# Patient Record
Sex: FEMALE | Race: WHITE | Employment: OTHER | URBAN - METROPOLITAN AREA
[De-identification: names, ages, dates, MRNs, and addresses within clinical notes are randomized per-mention and may not be internally consistent; named-entity substitution may affect disease eponyms.]

---

## 2017-03-13 DIAGNOSIS — I10 ESSENTIAL HYPERTENSION: ICD-10-CM

## 2017-03-14 RX ORDER — ATENOLOL 25 MG/1
TABLET ORAL
Qty: 90 TAB | Refills: 0 | Status: SHIPPED | OUTPATIENT
Start: 2017-03-14 | End: 2017-05-08 | Stop reason: SDUPTHER

## 2017-03-14 NOTE — TELEPHONE ENCOUNTER
She is due in for her annual Medicare Wellness visit with Dr Yazmin Cordova and fasting labs by the end of next month.

## 2017-03-27 DIAGNOSIS — I10 ESSENTIAL HYPERTENSION: ICD-10-CM

## 2017-03-27 DIAGNOSIS — R73.03 PREDIABETES: ICD-10-CM

## 2017-03-27 DIAGNOSIS — E55.9 VITAMIN D DEFICIENCY: ICD-10-CM

## 2017-03-27 DIAGNOSIS — D36.9 TUBULAR ADENOMA: Primary | ICD-10-CM

## 2017-03-27 DIAGNOSIS — E78.00 PURE HYPERCHOLESTEROLEMIA: ICD-10-CM

## 2017-03-27 NOTE — LETTER
5/4/2017 12:26 PM 
 
Ms. Wero Mathis 
[address] [facility] [address] Dear Wero Mathis: 
 
Please find your most recent results below. Resulted Orders VITAMIN D, 25 HYDROXY Result Value Ref Range VITAMIN D, 25-HYDROXY 33.3 30.0 - 100.0 ng/mL Comment:  
   Vitamin D deficiency has been defined by the [address] [place] [address] practice guideline as a 
level of serum 25-OH vitamin D less than 20 ng/mL (1,2). The Endocrine Society went on to further define vitamin D 
insufficiency as a level between 21 and 29 ng/mL (2). 1. IOM ([place] of Medicine). 2010. Dietary reference 
   intakes for calcium and D. [facility]: The 
   [employer]. 2. Hyacinth MF, Linda OQUENDO, Jacek MARADIAGA, et al. 
   Evaluation, treatment, and prevention of vitamin D 
   deficiency: an Endocrine Society clinical practice 
   guideline. JCEM. 2011 Jul; 96(7):1911-30. Narrative Performed at:  [address]  [id number] : Rhett Stephens MD, Phone:  [phone] HEMOGLOBIN A1C WITH EAG Result Value Ref Range Hemoglobin A1c 6.2 (H) 4.8 - 5.6 % Comment:  
            Pre-diabetes: 5.7 - 6.4 Diabetes: >6.4 Glycemic control for adults with diabetes: <7.0 Estimated average glucose 131 mg/dL Narrative Performed at:  [address]  [id number] : Rhett Stephens MD, Phone:  [phone] CBC WITH AUTOMATED DIFF Result Value Ref Range WBC 5.8 3.4 - 10.8 x10E3/uL  
 RBC 4.58 3.77 - 5.28 x10E6/uL HGB 13.7 11.1 - 15.9 g/dL HCT 41.2 34.0 - 46.6 % MCV 90 79 - 97 fL  
 MCH 29.9 26.6 - 33.0 pg  
 MCHC 33.3 31.5 - 35.7 g/dL  
 RDW 15.0 12.3 - 15.4 % PLATELET 141 946 - 615 x10E3/uL NEUTROPHILS 54 % Lymphocytes 33 % MONOCYTES 8 % EOSINOPHILS 4 % BASOPHILS 1 % ABS. NEUTROPHILS 3.2 1.4 - 7.0 x10E3/uL Abs Lymphocytes 1.9 0.7 - 3.1 x10E3/uL  
 ABS. MONOCYTES 0.5 0.1 - 0.9 x10E3/uL  
 ABS. EOSINOPHILS 0.2 0.0 - 0.4 x10E3/uL  
 ABS. BASOPHILS 0.0 0.0 - 0.2 x10E3/uL IMMATURE GRANULOCYTES 0 %  
 ABS. IMM. GRANS. 0.0 0.0 - 0.1 x10E3/uL Narrative Performed at:  [address]  [id number] : Toña Cheung MD, Phone:  [phone] URINALYSIS W/ RFLX MICROSCOPIC Result Value Ref Range Specific Gravity 1.018 1.005 - 1.030  
 pH (UA) 6.5 5.0 - 7.5 Color Yellow Yellow Appearance Clear Clear Leukocyte Esterase Negative Negative Protein Negative Negative/Trace Glucose Negative Negative Ketone Negative Negative Blood Negative Negative Bilirubin Negative Negative Urobilinogen 0.2 0.2 - 1.0 mg/dL Nitrites Negative Negative Microscopic Examination Comment Comment:  
   Microscopic not indicated and not performed. Narrative Performed at:  [address]  [id number] : Toña Cheung MD, Phone:  [phone] TSH 3RD GENERATION Result Value Ref Range TSH 2.820 0.450 - 4.500 uIU/mL Narrative Performed at:  [address]  [id number] : Toña Cheung MD, Phone:  [phone] METABOLIC PANEL, COMPREHENSIVE Result Value Ref Range Glucose 104 (H) 65 - 99 mg/dL BUN 13 8 - 27 mg/dL Creatinine 0.65 0.57 - 1.00 mg/dL GFR est non-AA 88 >59 mL/min/1.73 GFR est  >59 mL/min/1.73  
 BUN/Creatinine ratio 20 12 - 28 Sodium 142 134 - 144 mmol/L Potassium 3.9 3.5 - 5.2 mmol/L Chloride 99 96 - 106 mmol/L  
 CO2 27 18 - 29 mmol/L Calcium 9.6 8.7 - 10.3 mg/dL Protein, total 6.1 6.0 - 8.5 g/dL Albumin 4.1 3.5 - 4.8 g/dL GLOBULIN, TOTAL 2.0 1.5 - 4.5 g/dL A-G Ratio 2.1 1.2 - 2.2 Bilirubin, total 0.5 0.0 - 1.2 mg/dL Alk. phosphatase 51 39 - 117 IU/L  
 AST (SGOT) 27 0 - 40 IU/L  
 ALT (SGPT) 20 0 - 32 IU/L Narrative Performed at:  [address]  [id number] : Lisbet Mauricio MD, Phone:  [phone] LIPID PANEL Result Value Ref Range Cholesterol, total 161 100 - 199 mg/dL Triglyceride 103 0 - 149 mg/dL HDL Cholesterol 68 >39 mg/dL VLDL, calculated 21 5 - 40 mg/dL LDL, calculated 72 0 - 99 mg/dL Narrative Performed at:  [address]  [id number] : Lisbet Mauricio MD, Phone:  [phone] CVD REPORT Result Value Ref Range INTERPRETATION Note Comment:  
   Supplement report is available. Narrative Performed at:  [address] [address] [place], [place]  [id number] : Altaf Mark PhD, Phone:  [phone] Josh Ibrahim MD

## 2017-04-05 ENCOUNTER — TELEPHONE (OUTPATIENT)
Dept: FAMILY MEDICINE CLINIC | Age: 72
End: 2017-04-05

## 2017-04-05 NOTE — TELEPHONE ENCOUNTER
Patient requesting a return call. Says she has questions about the paperwork she received in the mail. Her contact # is [phone].

## 2017-04-06 NOTE — TELEPHONE ENCOUNTER
Left message on name ID'd voicemail. She is most likely coming in the office this am with her .  Requested return call if we didn't speak this am.  Info discussed with patient when she did come in-she had question about insurance and this was answered by the [facility]

## 2017-04-27 LAB — COLONOSCOPY, EXTERNAL: NORMAL

## 2017-05-04 LAB
25(OH)D3+25(OH)D2 SERPL-MCNC: 33.3 NG/ML (ref 30–100)
ALBUMIN SERPL-MCNC: 4.1 G/DL (ref 3.5–4.8)
ALBUMIN/GLOB SERPL: 2.1 {RATIO} (ref 1.2–2.2)
ALP SERPL-CCNC: 51 IU/L (ref 39–117)
ALT SERPL-CCNC: 20 IU/L (ref 0–32)
APPEARANCE UR: CLEAR
AST SERPL-CCNC: 27 IU/L (ref 0–40)
BASOPHILS # BLD AUTO: 0 X10E3/UL (ref 0–0.2)
BASOPHILS NFR BLD AUTO: 1 %
BILIRUB SERPL-MCNC: 0.5 MG/DL (ref 0–1.2)
BILIRUB UR QL STRIP: NEGATIVE
BUN SERPL-MCNC: 13 MG/DL (ref 8–27)
BUN/CREAT SERPL: 20 (ref 12–28)
CALCIUM SERPL-MCNC: 9.6 MG/DL (ref 8.7–10.3)
CHLORIDE SERPL-SCNC: 99 MMOL/L (ref 96–106)
CHOLEST SERPL-MCNC: 161 MG/DL (ref 100–199)
CO2 SERPL-SCNC: 27 MMOL/L (ref 18–29)
COLOR UR: YELLOW
CREAT SERPL-MCNC: 0.65 MG/DL (ref 0.57–1)
EOSINOPHIL # BLD AUTO: 0.2 X10E3/UL (ref 0–0.4)
EOSINOPHIL NFR BLD AUTO: 4 %
ERYTHROCYTE [DISTWIDTH] IN BLOOD BY AUTOMATED COUNT: 15 % (ref 12.3–15.4)
EST. AVERAGE GLUCOSE BLD GHB EST-MCNC: 131 MG/DL
GLOBULIN SER CALC-MCNC: 2 G/DL (ref 1.5–4.5)
GLUCOSE SERPL-MCNC: 104 MG/DL (ref 65–99)
GLUCOSE UR QL: NEGATIVE
HBA1C MFR BLD: 6.2 % (ref 4.8–5.6)
HCT VFR BLD AUTO: 41.2 % (ref 34–46.6)
HDLC SERPL-MCNC: 68 MG/DL
HGB BLD-MCNC: 13.7 G/DL (ref 11.1–15.9)
HGB UR QL STRIP: NEGATIVE
IMM GRANULOCYTES # BLD: 0 X10E3/UL (ref 0–0.1)
IMM GRANULOCYTES NFR BLD: 0 %
INTERPRETATION, 910389: NORMAL
KETONES UR QL STRIP: NEGATIVE
LDLC SERPL CALC-MCNC: 72 MG/DL (ref 0–99)
LEUKOCYTE ESTERASE UR QL STRIP: NEGATIVE
LYMPHOCYTES # BLD AUTO: 1.9 X10E3/UL (ref 0.7–3.1)
LYMPHOCYTES NFR BLD AUTO: 33 %
MCH RBC QN AUTO: 29.9 PG (ref 26.6–33)
MCHC RBC AUTO-ENTMCNC: 33.3 G/DL (ref 31.5–35.7)
MCV RBC AUTO: 90 FL (ref 79–97)
MICRO URNS: NORMAL
MONOCYTES # BLD AUTO: 0.5 X10E3/UL (ref 0.1–0.9)
MONOCYTES NFR BLD AUTO: 8 %
NEUTROPHILS # BLD AUTO: 3.2 X10E3/UL (ref 1.4–7)
NEUTROPHILS NFR BLD AUTO: 54 %
NITRITE UR QL STRIP: NEGATIVE
PH UR STRIP: 6.5 [PH] (ref 5–7.5)
PLATELET # BLD AUTO: 230 X10E3/UL (ref 150–379)
POTASSIUM SERPL-SCNC: 3.9 MMOL/L (ref 3.5–5.2)
PROT SERPL-MCNC: 6.1 G/DL (ref 6–8.5)
PROT UR QL STRIP: NEGATIVE
RBC # BLD AUTO: 4.58 X10E6/UL (ref 3.77–5.28)
SODIUM SERPL-SCNC: 142 MMOL/L (ref 134–144)
SP GR UR: 1.02 (ref 1–1.03)
TRIGL SERPL-MCNC: 103 MG/DL (ref 0–149)
TSH SERPL DL<=0.005 MIU/L-ACNC: 2.82 UIU/ML (ref 0.45–4.5)
UROBILINOGEN UR STRIP-MCNC: 0.2 MG/DL (ref 0.2–1)
VLDLC SERPL CALC-MCNC: 21 MG/DL (ref 5–40)
WBC # BLD AUTO: 5.8 X10E3/UL (ref 3.4–10.8)

## 2017-05-08 ENCOUNTER — OFFICE VISIT (OUTPATIENT)
Dept: FAMILY MEDICINE CLINIC | Age: 72
End: 2017-05-08

## 2017-05-08 VITALS
WEIGHT: 206.8 LBS | TEMPERATURE: 97.4 F | RESPIRATION RATE: 18 BRPM | HEART RATE: 79 BPM | OXYGEN SATURATION: 95 % | DIASTOLIC BLOOD PRESSURE: 75 MMHG | HEIGHT: 64 IN | SYSTOLIC BLOOD PRESSURE: 144 MMHG | BODY MASS INDEX: 35.3 KG/M2

## 2017-05-08 DIAGNOSIS — M85.80 OSTEOPENIA, UNSPECIFIED LOCATION: ICD-10-CM

## 2017-05-08 DIAGNOSIS — Z00.00 MEDICARE ANNUAL WELLNESS VISIT, SUBSEQUENT: Primary | ICD-10-CM

## 2017-05-08 DIAGNOSIS — Z13.39 SCREENING FOR ALCOHOLISM: ICD-10-CM

## 2017-05-08 DIAGNOSIS — I10 ESSENTIAL HYPERTENSION: ICD-10-CM

## 2017-05-08 DIAGNOSIS — E78.00 PURE HYPERCHOLESTEROLEMIA: ICD-10-CM

## 2017-05-08 DIAGNOSIS — R73.03 PREDIABETES: ICD-10-CM

## 2017-05-08 RX ORDER — ATORVASTATIN CALCIUM 40 MG/1
40 TABLET, FILM COATED ORAL DAILY
Qty: 90 TAB | Refills: 3 | Status: SHIPPED | COMMUNITY
Start: 2017-05-08 | End: 2018-05-04 | Stop reason: SDUPTHER

## 2017-05-08 RX ORDER — RALOXIFENE HYDROCHLORIDE 60 MG/1
TABLET, FILM COATED ORAL
Qty: 90 TAB | Refills: 3 | Status: SHIPPED | COMMUNITY
Start: 2017-05-08 | End: 2018-05-04 | Stop reason: SDUPTHER

## 2017-05-08 RX ORDER — INDAPAMIDE 1.25 MG/1
1.25 TABLET, FILM COATED ORAL DAILY
Qty: 90 TAB | Refills: 3 | Status: SHIPPED | COMMUNITY
Start: 2017-05-08 | End: 2018-05-04 | Stop reason: SDUPTHER

## 2017-05-08 RX ORDER — ATENOLOL 25 MG/1
TABLET ORAL
Qty: 90 TAB | Refills: 3 | Status: SHIPPED | COMMUNITY
Start: 2017-05-08 | End: 2018-05-04 | Stop reason: SDUPTHER

## 2017-05-08 NOTE — PROGRESS NOTES
Nurse history read and confirmed by patient. Visit Vitals    /75 (BP 1 Location: Left arm, BP Patient Position: Sitting)    Pulse 79    Temp 97.4 °F (36.3 °C)    Resp 18    Ht 5' 4\" (1.626 m)    Wt 206 lb 12.8 oz (93.8 kg)    SpO2 95%    BMI 35.5 kg/m2       Patient alert and cooperative. Ears - canals, TMs clear. Jaw - no click appreciated. Assessment:  1. Left sided ear pain, question etiology. Does not appear to be due to the canal, TM or right TMJ.  2. HTN, controlled on low dose beta blocker and Indapamide. 3. Hypercholesterol, on statin. 4. Prediabetes, working on diet and exercise. 5. Osteopenia, on chronic Evista. Plan:  1. Refilled meds for a year. 2. Follow otherwise here prn.

## 2017-05-08 NOTE — ACP (ADVANCE CARE PLANNING)
Advance Care Planning    Advance Care Planning (ACP) Provider Conversation Snapshot    Date of ACP Conversation: 05/08/17  Persons included in Conversation:  patient  Length of ACP Conversation in minutes:  <16 minutes (Non-Billable)    Authorized Decision Maker (if patient is incapable of making informed decisions): This person is:    Other Legally Authorized Decision Maker (e.g. Next of Kin)  , daughter-Juliane, nephew-Germán        For Patients with Decision Making Capacity:   Values/Goals: Exploration of values, goals, and preferences if recovery is not expected, even with continued medical treatment in the event of:  Imminent death  Severe, permanent brain injury    Conversation Outcomes / Follow-Up Plan:   Reviewed existing Advance Directive

## 2017-05-08 NOTE — PROGRESS NOTES
Here for her wellness visit  She has seen derm since last visit-goes every 6 months- Dr Casas and we will request the report  Just had her colonoscopy done and we have this report  Eye exam is current and we will get this report from Dr Amiel Opitz  No ED visits since last visit here  ACP is on file  Non smoker and no plans to start

## 2017-05-08 NOTE — PROGRESS NOTES
HISTORY OF PRESENT ILLNESS  Samantha Claudio is a 74 y.o. female. HPI   Here for [address]     Review of Systems   Constitutional: Negative. HENT: Positive for ear pain. Eyes: Negative. Respiratory: Negative. Cardiovascular: Positive for palpitations. Gastrointestinal: Negative. Genitourinary: Negative. Musculoskeletal: Negative. Skin: Negative. Neurological: Negative. Endo/Heme/Allergies: Positive for environmental allergies. Psychiatric/Behavioral: Negative. Physical Exam   NA    ASSESSMENT and PLAN  See below     This is a Subsequent Medicare Annual Wellness Visit providing Personalized Prevention Plan Services (PPPS) (Performed 12 months after initial AWV and PPPS )    I have reviewed the patient's medical history in detail and updated the computerized patient record. Eye doctor past yr. Dentist 3 x annually. Colonoscopy last month. 10 yr repeat. No Gyn. Mammo annually. DEXA q 2 yrs. On Evista for osteopenia. Derm 2 x annually. H/o BCCAs. No signif hx past yr. History     Past Medical History:   Diagnosis Date    Abnormal CT scan 3/2015 Dr Senthil Sterling    ?  infectious process on CT 6/1/16 f/u note    Advance directive in chart 01/24/2012    Breast calcifications on mammogram     Cancer ([facility] [address] 1/2009    basal  cell dr Radha Collins q 6months 10/20/15    Dysphagia     Hiatal hernia     History of chicken pox     Hypercholesterolemia     Hypertension     Osteopenia     7/14/15 dexa still shows osteopenia    Seasonal allergies     Shingles     age 50's    Shoulder pain, left     Tubular adenoma of colon 2/3/12    dr Wes Deleon 5 yr repeat    Viral illness 12/23/14    Pt First note-flu like sx    Vitamin D deficiency       Past Surgical History:   Procedure Laterality Date    BREAST SURGERY PROCEDURE UNLISTED      history of several biopsies, college, and last one in 1's    COLONOSCOPY  2/3/12    5 yr repeat    ENDOSCOPY, COLON, DIAGNOSTIC  12/01    10 yr repeat. [place]    HX COLONOSCOPY  04/27/2017    10 yr repeat [facility]    c section    HX ORTHOPAEDIC  1996    rotator cuff/rt shoulder reconstruction surgery    HX SKIN BIOPSY  1/2009    dr Gurmeet Garza- basal cell     Current Outpatient Prescriptions   Medication Sig Dispense Refill    atenolol (TENORMIN) 25 mg tablet TAKE 1 TABLET DAILY 90 Tab 0    econazole nitrate (SPECTAZOLE) 1 % topical cream Apply  to affected area two (2) times a day. 15 g 0    Biotin 2,500 mcg cap Take 5,000 mcg by mouth daily.  fexofenadine (ALLEGRA) 180 mg tablet Take 180 mg by mouth. Take half tablet as needed      atorvastatin (LIPITOR) 40 mg tablet Take 1 Tab by mouth daily. 90 Tab 3    indapamide (LOZOL) 1.25 mg tablet Take 1 Tab by mouth daily. 90 Tab 3    raloxifene (EVISTA) 60 mg tablet TAKE 1 TABLET DAILY 90 Tab 3    Cholecalciferol, Vitamin D3, (VITAMIN D) 2,000 unit Cap Take 2,000 Units by mouth two (2) times a day.  aspirin delayed-release 81 mg tablet Take 81 mg by mouth daily. m-w-f       MULTIVITAMINS (MULTIPLE VITAMIN PO) Take  by mouth daily.        Allergies   Allergen Reactions    Sulfa (Sulfonamide Antibiotics) Rash and Itching    Actonel [Risedronate] Other (comments)     Gi upset    Erythromycin Other (comments)     GI upset    Pcn [Penicillins] Other (comments)     Pain in her hands     Family History   Problem Relation Age of Onset    Heart Disease Mother     Heart Disease Father     Cancer Father      bladder    Cancer Brother      espohagus    Stroke Maternal Grandfather      Social History   Substance Use Topics    Smoking status: Never Smoker    Smokeless tobacco: Never Used    Alcohol use 3.5 oz/week     7 Glasses of wine per week     Patient Active Problem List   Diagnosis Code    Essential hypertension I10    Pure hypercholesterolemia E78.00    Osteopenia M85.80    Allergic rhinitis J30.9    Vitamin D deficiency E55.9    Toenail fungus B35.1    Elevated BP [id number]    Incidental lung nodule, greater than or equal to 8mm R91.1    Prediabetes R73.03       Depression Risk Factor Screening:     PHQ over the last two weeks 5/8/2017   Little interest or pleasure in doing things Not at all   Feeling down, depressed or hopeless Not at all   Total Score PHQ 2 0     Alcohol Risk Factor Screening: On any occasion during the past 3 months, have you had more than 3 drinks containing alcohol? No    Do you average more than 7 drinks per week? No      Functional Ability and Level of Safety:     Hearing Loss   borderline normal    Activities of Daily Living   Self-care. Requires assistance with: no ADLs    Fall Risk     Fall Risk Assessment, last 12 mths 5/8/2017   Able to walk? Yes   Fall in past 12 months? No     Abuse Screen   Patient is not abused    Review of Systems   See above    Physical Examination     Evaluation of Cognitive Function:  Mood/affect:  happy  Appearance: age appropriate, casually dressed and within normal Limits  Family member/caregiver input: NA    No exam performed today, NA. Patient Care Team:  Leticia Mar MD as PCP - Sara Francois MD (Ophthalmology)  Susan Cleaning MD (Gastroenterology)  Elizabeth Schroeder MD (Dermatology)  Anahi iHll MD (Pulmonary Disease)  Sonia Washburn MD (Colon and Rectal Surgery)    Advice/Referrals/Counseling   Education and counseling provided:  Are appropriate based on today's review and evaluation  End-of-Life planning (with patient's consent)  Pneumococcal Vaccine  Influenza Vaccine  Screening Mammography  Colorectal cancer screening tests  Cardiovascular screening blood test  Bone mass measurement (DEXA)  Screening for glaucoma  Diabetes screening test      Assessment/Plan       ICD-10-CM ICD-9-CM    1. Medicare annual wellness visit, subsequent Z00.00 V70.0    2. Screening for alcoholism Z13.89 V79.1    3.  Osteopenia, unspecified location M85.80 733.90 raloxifene (EVISTA) 60 mg tablet 4. Prediabetes R73.03 790.29    5. Essential hypertension I10 401.9 atenolol (TENORMIN) 25 mg tablet      indapamide (LOZOL) 1.25 mg tablet   6. Pure hypercholesterolemia E78.00 272.0 atorvastatin (LIPITOR) 40 mg tablet     Follow-up Disposition:  Return in about 1 year (around 5/8/2018) for [address] [facility]

## 2017-05-31 ENCOUNTER — HOSPITAL ENCOUNTER (OUTPATIENT)
Dept: GENERAL RADIOLOGY | Age: 72
Discharge: HOME | End: 2017-05-31
Payer: MEDICARE

## 2017-05-31 DIAGNOSIS — R93.89 ABNORMAL CHEST CT: ICD-10-CM

## 2017-05-31 PROCEDURE — 71020 XR CHEST PA LAT: CPT

## 2018-03-02 ENCOUNTER — TELEPHONE (OUTPATIENT)
Dept: FAMILY MEDICINE CLINIC | Age: 73
End: 2018-03-02

## 2018-03-02 NOTE — TELEPHONE ENCOUNTER
----- Message from Rebecca Inman sent at 3/2/2018  1:06 PM EST -----  Regarding: Dr. Armando Wu / Telephone  Pt is requesting lab orders be mailed out to the her.   Best contact: [phone]

## 2018-03-07 DIAGNOSIS — I10 ESSENTIAL HYPERTENSION: ICD-10-CM

## 2018-03-07 DIAGNOSIS — M85.80 OSTEOPENIA, UNSPECIFIED LOCATION: ICD-10-CM

## 2018-03-07 DIAGNOSIS — R73.09 INCREASED GLUCOSE LEVEL: Primary | ICD-10-CM

## 2018-03-07 DIAGNOSIS — E78.00 PURE HYPERCHOLESTEROLEMIA: ICD-10-CM

## 2018-03-07 DIAGNOSIS — R73.03 PREDIABETES: ICD-10-CM

## 2018-03-07 DIAGNOSIS — E55.9 VITAMIN D DEFICIENCY: ICD-10-CM

## 2018-03-07 NOTE — LETTER
4/24/2018 8:49 AM 
 
Ms. Daniel Franklin [address] [place] [address] Dear Swetha Leonard: 
 
Please find your most recent results below. Resulted Orders HEMOGLOBIN A1C WITH EAG Result Value Ref Range Hemoglobin A1c 6.0 (H) 4.8 - 5.6 % Comment:  
            Pre-diabetes: 5.7 - 6.4 Diabetes: >6.4 Glycemic control for adults with diabetes: <7.0 Estimated average glucose 126 mg/dL Narrative Performed at:  [address]  [id number] : Randy Tena MD, Phone:  [phone] VITAMIN D, 25 HYDROXY Result Value Ref Range VITAMIN D, 25-HYDROXY 37.9 30.0 - 100.0 ng/mL Comment:  
   Vitamin D deficiency has been defined by the [address] [place] [address] practice guideline as a 
level of serum 25-OH vitamin D less than 20 ng/mL (1,2). The Endocrine Society went on to further define vitamin D 
insufficiency as a level between 21 and 29 ng/mL (2). 1. IOM ([place] of Medicine). 2010. Dietary reference 
   intakes for calcium and D. [address] [place] [address]: The 
   [employer]. 2. Hyacinth MF, Linda NC, Jacek MARADIAGA, et al. 
   Evaluation, treatment, and prevention of vitamin D 
   deficiency: an Endocrine Society clinical practice 
   guideline. JCEM. 2011 Jul; 96(7):1911-30. Narrative Performed at:  [address]  [id number] : Randy Tena MD, Phone:  [phone] CBC WITH AUTOMATED DIFF Result Value Ref Range WBC 6.4 3.4 - 10.8 x10E3/uL  
 RBC 4.74 3.77 - 5.28 x10E6/uL HGB 14.1 11.1 - 15.9 g/dL HCT 43.4 34.0 - 46.6 % MCV 92 79 - 97 fL  
 MCH 29.7 26.6 - 33.0 pg  
 MCHC 32.5 31.5 - 35.7 g/dL  
 RDW 15.0 12.3 - 15.4 % PLATELET 934 569 - 049 x10E3/uL NEUTROPHILS 52 Not Estab. % Lymphocytes 34 Not Estab. %  MONOCYTES 9 Not Estab. %  
 EOSINOPHILS 4 Not Estab. % BASOPHILS 1 Not Estab. %  
 ABS. NEUTROPHILS 3.3 1.4 - 7.0 x10E3/uL Abs Lymphocytes 2.2 0.7 - 3.1 x10E3/uL  
 ABS. MONOCYTES 0.6 0.1 - 0.9 x10E3/uL  
 ABS. EOSINOPHILS 0.2 0.0 - 0.4 x10E3/uL  
 ABS. BASOPHILS 0.0 0.0 - 0.2 x10E3/uL IMMATURE GRANULOCYTES 0 Not Estab. %  
 ABS. IMM. GRANS. 0.0 0.0 - 0.1 x10E3/uL Narrative Performed at:  [address]  [id number] : Savanah Ricci MD, Phone:  [phone] URINALYSIS W/ RFLX MICROSCOPIC Result Value Ref Range Specific Gravity 1.009 1.005 - 1.030  
 pH (UA) 7.5 5.0 - 7.5 Color Yellow Yellow Appearance Clear Clear Leukocyte Esterase Negative Negative Protein Negative Negative/Trace Glucose Negative Negative Ketone Negative Negative Blood Negative Negative Bilirubin Negative Negative Urobilinogen 0.2 0.2 - 1.0 mg/dL Nitrites Negative Negative Microscopic Examination Comment Comment:  
   Microscopic not indicated and not performed. Narrative Performed at:  [address]  [id number] : Savanah Ricci MD, Phone:  [phone] TSH 3RD GENERATION Result Value Ref Range TSH 3.230 0.450 - 4.500 uIU/mL Narrative Performed at:  [address]  [id number] : Savanah Ricci MD, Phone:  [phone] METABOLIC PANEL, COMPREHENSIVE Result Value Ref Range Glucose 104 (H) 65 - 99 mg/dL Comment:  
   Specimen received hemolyzed. Clinical correlation indicated. BUN 15 8 - 27 mg/dL Creatinine 0.67 0.57 - 1.00 mg/dL GFR est non-AA 86 >59 mL/min/1.73 GFR est AA 99 >59 mL/min/1.73  
 BUN/Creatinine ratio 22 12 - 28 Sodium 142 134 - 144 mmol/L Potassium 4.3 3.5 - 5.2 mmol/L Comment:  
   Specimen received hemolyzed. Clinical correlation indicated.   
 Chloride 99 96 - 106 mmol/L  
 CO2 25 18 - 29 mmol/L Calcium 9.6 8.7 - 10.3 mg/dL Protein, total 6.5 6.0 - 8.5 g/dL Albumin 4.1 3.5 - 4.8 g/dL GLOBULIN, TOTAL 2.4 1.5 - 4.5 g/dL A-G Ratio 1.7 1.2 - 2.2 Bilirubin, total 0.5 0.0 - 1.2 mg/dL Alk. phosphatase 53 39 - 117 IU/L  
 AST (SGOT) 31 0 - 40 IU/L  
 ALT (SGPT) 24 0 - 32 IU/L Narrative Performed at:  [address]  [id number] : Lacy Cervantes MD, Phone:  [phone] LIPID PANEL Result Value Ref Range Cholesterol, total 173 100 - 199 mg/dL Triglyceride 113 0 - 149 mg/dL HDL Cholesterol 70 >39 mg/dL VLDL, calculated 23 5 - 40 mg/dL LDL, calculated 80 0 - 99 mg/dL Narrative Performed at:  [address]  [id number] : Lacy Cervantes MD, Phone:  [phone] CVD REPORT Result Value Ref Range INTERPRETATION Note Comment:  
   Supplemental report is available. Narrative Performed at:  [address] [place] [address]  [id number] : Jane Locke PhD, Phone:  [phone] Average BS remains in mid prediabetic range, sltly improved.  Rest labs all OK.  
   
Attached to Please call me if you have any questions: [phone] Sincerely, Kiel Kaur MD

## 2018-04-19 LAB
25(OH)D3+25(OH)D2 SERPL-MCNC: 37.9 NG/ML (ref 30–100)
ALBUMIN SERPL-MCNC: 4.1 G/DL (ref 3.5–4.8)
ALBUMIN/GLOB SERPL: 1.7 {RATIO} (ref 1.2–2.2)
ALP SERPL-CCNC: 53 IU/L (ref 39–117)
ALT SERPL-CCNC: 24 IU/L (ref 0–32)
APPEARANCE UR: CLEAR
AST SERPL-CCNC: 31 IU/L (ref 0–40)
BASOPHILS # BLD AUTO: 0 X10E3/UL (ref 0–0.2)
BASOPHILS NFR BLD AUTO: 1 %
BILIRUB SERPL-MCNC: 0.5 MG/DL (ref 0–1.2)
BILIRUB UR QL STRIP: NEGATIVE
BUN SERPL-MCNC: 15 MG/DL (ref 8–27)
BUN/CREAT SERPL: 22 (ref 12–28)
CALCIUM SERPL-MCNC: 9.6 MG/DL (ref 8.7–10.3)
CHLORIDE SERPL-SCNC: 99 MMOL/L (ref 96–106)
CHOLEST SERPL-MCNC: 173 MG/DL (ref 100–199)
CO2 SERPL-SCNC: 25 MMOL/L (ref 18–29)
COLOR UR: YELLOW
CREAT SERPL-MCNC: 0.67 MG/DL (ref 0.57–1)
EOSINOPHIL # BLD AUTO: 0.2 X10E3/UL (ref 0–0.4)
EOSINOPHIL NFR BLD AUTO: 4 %
ERYTHROCYTE [DISTWIDTH] IN BLOOD BY AUTOMATED COUNT: 15 % (ref 12.3–15.4)
EST. AVERAGE GLUCOSE BLD GHB EST-MCNC: 126 MG/DL
GFR SERPLBLD CREATININE-BSD FMLA CKD-EPI: 86 ML/MIN/1.73
GFR SERPLBLD CREATININE-BSD FMLA CKD-EPI: 99 ML/MIN/1.73
GLOBULIN SER CALC-MCNC: 2.4 G/DL (ref 1.5–4.5)
GLUCOSE SERPL-MCNC: 104 MG/DL (ref 65–99)
GLUCOSE UR QL: NEGATIVE
HBA1C MFR BLD: 6 % (ref 4.8–5.6)
HCT VFR BLD AUTO: 43.4 % (ref 34–46.6)
HDLC SERPL-MCNC: 70 MG/DL
HGB BLD-MCNC: 14.1 G/DL (ref 11.1–15.9)
HGB UR QL STRIP: NEGATIVE
IMM GRANULOCYTES # BLD: 0 X10E3/UL (ref 0–0.1)
IMM GRANULOCYTES NFR BLD: 0 %
INTERPRETATION, 910389: NORMAL
KETONES UR QL STRIP: NEGATIVE
LDLC SERPL CALC-MCNC: 80 MG/DL (ref 0–99)
LEUKOCYTE ESTERASE UR QL STRIP: NEGATIVE
LYMPHOCYTES # BLD AUTO: 2.2 X10E3/UL (ref 0.7–3.1)
LYMPHOCYTES NFR BLD AUTO: 34 %
MCH RBC QN AUTO: 29.7 PG (ref 26.6–33)
MCHC RBC AUTO-ENTMCNC: 32.5 G/DL (ref 31.5–35.7)
MCV RBC AUTO: 92 FL (ref 79–97)
MICRO URNS: NORMAL
MONOCYTES # BLD AUTO: 0.6 X10E3/UL (ref 0.1–0.9)
MONOCYTES NFR BLD AUTO: 9 %
NEUTROPHILS # BLD AUTO: 3.3 X10E3/UL (ref 1.4–7)
NEUTROPHILS NFR BLD AUTO: 52 %
NITRITE UR QL STRIP: NEGATIVE
PH UR STRIP: 7.5 [PH] (ref 5–7.5)
PLATELET # BLD AUTO: 241 X10E3/UL (ref 150–379)
POTASSIUM SERPL-SCNC: 4.3 MMOL/L (ref 3.5–5.2)
PROT SERPL-MCNC: 6.5 G/DL (ref 6–8.5)
PROT UR QL STRIP: NEGATIVE
RBC # BLD AUTO: 4.74 X10E6/UL (ref 3.77–5.28)
SODIUM SERPL-SCNC: 142 MMOL/L (ref 134–144)
SP GR UR: 1.01 (ref 1–1.03)
TRIGL SERPL-MCNC: 113 MG/DL (ref 0–149)
TSH SERPL DL<=0.005 MIU/L-ACNC: 3.23 UIU/ML (ref 0.45–4.5)
UROBILINOGEN UR STRIP-MCNC: 0.2 MG/DL (ref 0.2–1)
VLDLC SERPL CALC-MCNC: 23 MG/DL (ref 5–40)
WBC # BLD AUTO: 6.4 X10E3/UL (ref 3.4–10.8)

## 2018-05-02 ENCOUNTER — PATIENT MESSAGE (OUTPATIENT)
Dept: FAMILY MEDICINE CLINIC | Age: 73
End: 2018-05-02

## 2018-05-02 DIAGNOSIS — M85.80 OSTEOPENIA, UNSPECIFIED LOCATION: ICD-10-CM

## 2018-05-02 DIAGNOSIS — I10 ESSENTIAL HYPERTENSION: ICD-10-CM

## 2018-05-02 DIAGNOSIS — E78.00 PURE HYPERCHOLESTEROLEMIA: ICD-10-CM

## 2018-05-04 NOTE — TELEPHONE ENCOUNTER
From: Cinthia Brown  To: Gillian Davies MD  Sent: 5/2/2018 12:13 PM EDT  Subject: Non-Urgent Medical Question    Hi Josueazeb Guthrie received a message saying Dr. Tanner Wiggins would not be back in the office until July. I have an appointment with him next week on May 9th. Who is covering for him? Also will you check on my 90 day mail order refills? Thanks for mailing my lab results.   Dwayne Mariee

## 2018-05-04 NOTE — TELEPHONE ENCOUNTER
PCP: Kevin Martínez MD    Last appt: 5/8/2017  No future appointments. Requested Prescriptions     Pending Prescriptions Disp Refills    atenolol (TENORMIN) 25 mg tablet 90 Tab 0     Sig: TAKE 1 TABLET DAILY    raloxifene (EVISTA) 60 mg tablet 90 Tab 0     Sig: TAKE 1 TABLET DAILY    indapamide (LOZOL) 1.25 mg tablet 90 Tab 0     Sig: Take 1 Tab by mouth daily.  atorvastatin (LIPITOR) 40 mg tablet 90 Tab 0     Sig: Take 1 Tab by mouth daily.      Lab Results   Component Value Date/Time    Sodium 142 04/18/2018 08:35 AM    Potassium 4.3 04/18/2018 08:35 AM    Chloride 99 04/18/2018 08:35 AM    CO2 25 04/18/2018 08:35 AM    Anion gap 5 07/20/2009 09:53 AM    Glucose 104 (H) 04/18/2018 08:35 AM    BUN 15 04/18/2018 08:35 AM    Creatinine 0.67 04/18/2018 08:35 AM    BUN/Creatinine ratio 22 04/18/2018 08:35 AM    GFR est AA 99 04/18/2018 08:35 AM    GFR est non-AA 86 04/18/2018 08:35 AM    Calcium 9.6 04/18/2018 08:35 AM     Lab Results   Component Value Date/Time    Hemoglobin A1c 6.0 (H) 04/18/2018 08:35 AM    Hemoglobin A1c (POC) 5.8 04/07/2016 11:02 AM     Lab Results   Component Value Date/Time    Cholesterol, total 173 04/18/2018 08:35 AM    HDL Cholesterol 70 04/18/2018 08:35 AM    LDL, calculated 80 04/18/2018 08:35 AM    VLDL, calculated 23 04/18/2018 08:35 AM    Triglyceride 113 04/18/2018 08:35 AM    CHOL/HDL Ratio 2.7 07/27/2010 09:38 AM     Lab Results   Component Value Date/Time    TSH 3.230 04/18/2018 08:35 AM

## 2018-05-08 RX ORDER — ATENOLOL 25 MG/1
TABLET ORAL
Qty: 90 TAB | Refills: 0 | Status: SHIPPED | OUTPATIENT
Start: 2018-05-08 | End: 2018-07-26 | Stop reason: SDUPTHER

## 2018-05-08 RX ORDER — ATORVASTATIN CALCIUM 40 MG/1
40 TABLET, FILM COATED ORAL DAILY
Qty: 90 TAB | Refills: 0 | Status: SHIPPED | OUTPATIENT
Start: 2018-05-08 | End: 2018-07-26 | Stop reason: SDUPTHER

## 2018-05-08 RX ORDER — INDAPAMIDE 1.25 MG/1
1.25 TABLET, FILM COATED ORAL DAILY
Qty: 90 TAB | Refills: 0 | Status: SHIPPED | OUTPATIENT
Start: 2018-05-08 | End: 2018-07-26 | Stop reason: SDUPTHER

## 2018-05-08 RX ORDER — RALOXIFENE HYDROCHLORIDE 60 MG/1
TABLET, FILM COATED ORAL
Qty: 90 TAB | Refills: 0 | Status: SHIPPED | OUTPATIENT
Start: 2018-05-08 | End: 2018-07-26 | Stop reason: SDUPTHER

## 2018-07-26 ENCOUNTER — OFFICE VISIT (OUTPATIENT)
Dept: FAMILY MEDICINE CLINIC | Age: 73
End: 2018-07-26

## 2018-07-26 VITALS
HEIGHT: 64 IN | BODY MASS INDEX: 34.98 KG/M2 | SYSTOLIC BLOOD PRESSURE: 155 MMHG | RESPIRATION RATE: 14 BRPM | HEART RATE: 66 BPM | DIASTOLIC BLOOD PRESSURE: 81 MMHG | WEIGHT: 204.9 LBS | OXYGEN SATURATION: 92 % | TEMPERATURE: 96.7 F

## 2018-07-26 DIAGNOSIS — Z00.00 MEDICARE ANNUAL WELLNESS VISIT, SUBSEQUENT: Primary | ICD-10-CM

## 2018-07-26 DIAGNOSIS — E66.01 SEVERE OBESITY (BMI 35.0-39.9): ICD-10-CM

## 2018-07-26 DIAGNOSIS — Z71.89 ADVANCED DIRECTIVES, COUNSELING/DISCUSSION: ICD-10-CM

## 2018-07-26 DIAGNOSIS — I10 ELEVATED BLOOD PRESSURE READING IN OFFICE WITH DIAGNOSIS OF HYPERTENSION: ICD-10-CM

## 2018-07-26 DIAGNOSIS — I10 ESSENTIAL HYPERTENSION: ICD-10-CM

## 2018-07-26 DIAGNOSIS — M85.80 OSTEOPENIA, UNSPECIFIED LOCATION: ICD-10-CM

## 2018-07-26 DIAGNOSIS — E78.00 PURE HYPERCHOLESTEROLEMIA: ICD-10-CM

## 2018-07-26 RX ORDER — ATENOLOL 25 MG/1
TABLET ORAL
Qty: 90 TAB | Refills: 2 | Status: SHIPPED | COMMUNITY
Start: 2018-07-26 | End: 2019-06-14 | Stop reason: SDUPTHER

## 2018-07-26 RX ORDER — RALOXIFENE HYDROCHLORIDE 60 MG/1
TABLET, FILM COATED ORAL
Qty: 90 TAB | Refills: 2 | Status: SHIPPED | COMMUNITY
Start: 2018-07-26 | End: 2019-06-21 | Stop reason: SDUPTHER

## 2018-07-26 RX ORDER — ATORVASTATIN CALCIUM 40 MG/1
40 TABLET, FILM COATED ORAL DAILY
Qty: 90 TAB | Refills: 2 | Status: SHIPPED | COMMUNITY
Start: 2018-07-26 | End: 2018-12-13 | Stop reason: SDUPTHER

## 2018-07-26 RX ORDER — INDAPAMIDE 1.25 MG/1
1.25 TABLET, FILM COATED ORAL DAILY
Qty: 90 TAB | Refills: 2 | Status: SHIPPED | COMMUNITY
Start: 2018-07-26 | End: 2019-06-14 | Stop reason: SDUPTHER

## 2018-07-26 NOTE — PROGRESS NOTES
Hi Mcgill is a 74 y.o. female      Chief Complaint   Patient presents with   [facility] Wellness Visit       1. Have you been to the ER, urgent care clinic since your last visit? Hospitalized since your last visit? No    2. Have you seen or consulted any other health care providers outside of the [address] [place] [address] since your last visit? Include any pap smears or colon screening.  No

## 2018-07-26 NOTE — PROGRESS NOTES
This is the Subsequent Medicare Annual Wellness Exam, performed 12 months or more after the Initial AWV or the last Subsequent AWV    I have reviewed the patient's medical history in detail and updated the computerized patient record. Eye doctor past yr. Dentist q 4 mos. Derm q 6 mos. Annual mammo. Colonoscopy '14.  10 yr repeat. DEXA q 2 yrs for osteopenia. FH osteoporosis. Still on Evista. H/o BCCAs. No signif hx past yr. History     Past Medical History:   Diagnosis Date    Abnormal CT scan 3/2015 eval by Dr Jigar Peterson    ? infectious process on CT 6/1/16 f/u note  Pulmo Asso note 6/2/17    Acute low back pain 08/15/2017    2 visits to [facility] with Xrays on 2nd visit 8/18/17    Advance directive in chart 01/24/2012    Breast calcifications on mammogram     Cancer ([facility] [address] 1/2009    basal  cell dr Anaya Vuong q 6months 10/20/15    Dysphagia     Hiatal hernia     History of chicken pox     Hypercholesterolemia     Hypertension     Osteopenia     7/14/15 dexa still shows osteopenia    Screening for glaucoma 12/15/2016    note from [facility] w/IOP    Seasonal allergies     Shingles     age 50's    Shoulder pain, left     Skin exam, screening for cancer 04/12/2017    note from derm rec'd [place]    Tubular adenoma of colon 2/3/12    dr Yulissa Iniguez 5 yr repeat    Viral illness 12/23/14    Pt First note-flu like sx    Vitamin D deficiency       Past Surgical History:   Procedure Laterality Date    BREAST SURGERY PROCEDURE UNLISTED      history of several biopsies, college, and last one in 1's    COLONOSCOPY  2/3/12    5 yr repeat    ENDOSCOPY, COLON, DIAGNOSTIC  12/01    10 yr repeat.   [facility]    HX COLONOSCOPY  04/27/2017    10 yr [facility]    [facility] section    HX ORTHOPAEDIC  1996    rotator cuff/rt shoulder reconstruction surgery    HX SKIN BIOPSY  1/2009    dr Anaya Vuong- basal cell     Current Outpatient Prescriptions   Medication Sig Dispense Refill    atenolol (TENORMIN) 25 mg tablet TAKE 1 TABLET DAILY 90 Tab 0    raloxifene (EVISTA) 60 mg tablet TAKE 1 TABLET DAILY 90 Tab 0    indapamide (LOZOL) 1.25 mg tablet Take 1 Tab by mouth daily. 90 Tab 0    atorvastatin (LIPITOR) 40 mg tablet Take 1 Tab by mouth daily. 90 Tab 0    econazole nitrate (SPECTAZOLE) 1 % topical cream Apply  to affected area two (2) times a day. (Patient taking differently: Apply  to affected area as needed.) 15 g 0    Biotin 2,500 mcg cap Take 5,000 mcg by mouth daily.  fexofenadine (ALLEGRA) 180 mg tablet Take 180 mg by mouth. Take half tablet as needed      Cholecalciferol, Vitamin D3, (VITAMIN D) 2,000 unit Cap Take 2,000 Units by mouth two (2) times a day.  aspirin delayed-release 81 mg tablet Take 81 mg by mouth daily. m-w-f       MULTIVITAMINS (MULTIPLE VITAMIN PO) Take  by mouth daily.        Allergies   Allergen Reactions    Sulfa (Sulfonamide Antibiotics) Rash and Itching    Actonel [Risedronate] Other (comments)     Gi upset    Erythromycin Other (comments)     GI upset    Pcn [Penicillins] Other (comments)     Pain in her hands     Family History   Problem Relation Age of Onset    Heart Disease Mother     Heart Disease Father     Cancer Father      bladder    Cancer Brother      espohagus    Stroke Maternal Grandfather      Social History   Substance Use Topics    Smoking status: Never Smoker    Smokeless tobacco: Never Used    Alcohol use 3.5 oz/week     7 Glasses of wine per week     Patient Active Problem List   Diagnosis Code    Essential hypertension I10    Pure hypercholesterolemia E78.00    Osteopenia M85.80    Allergic rhinitis J30.9    Vitamin D deficiency E55.9    Toenail fungus B35.1    Incidental lung nodule, greater than or equal to 8mm R91.1    Prediabetes R73.03    Increased glucose level R73.09    Severe obesity (BMI 35.0-39.9) ([facility]) E66.01       Depression Risk Factor Screening:     PHQ over the last two weeks 7/26/2018 Little interest or pleasure in doing things Not at all   Feeling down, depressed, irritable, or hopeless Not at all   Total Score PHQ 2 0     Alcohol Risk Factor Screening: You do not drink alcohol or very rarely. Functional Ability and Level of Safety:   Hearing Loss  Hearing is good. Activities of Daily Living  The home contains: handrails, grab bars and rugs  Patient does total self care    Fall Risk  Fall Risk Assessment, last 12 mths 7/26/2018   Able to walk? Yes   Fall in past 12 months? No       Abuse Screen  Patient is not abused    Cognitive Screening   Evaluation of Cognitive Function:  Has your family/caregiver stated any concerns about your memory: no  Normal    Patient Care Team   Patient Care Team:  Je Shafer MD as PCP - Balbir Powell MD (Ophthalmology)  Leopold Brooke, MD (Gastroenterology)  Sakina Hayward MD (Dermatology)  Bessy Singh MD (Pulmonary Disease)  Deloris Najjar, MD (Colon and Rectal Surgery)    Assessment/Plan   Education and counseling provided:  Are appropriate based on today's review and evaluation  End-of-Life planning (with patient's consent)  Pneumococcal Vaccine  Influenza Vaccine  Screening Mammography  Colorectal cancer screening tests  Cardiovascular screening blood test  Bone mass measurement (DEXA)  Screening for glaucoma  Diabetes screening test    Diagnoses and all orders for this visit:    1. Medicare annual wellness visit, subsequent    2. Advanced directives, counseling/discussion    3. Essential hypertension  -     atenolol (TENORMIN) 25 mg tablet; TAKE 1 TABLET DAILY  -     indapamide (LOZOL) 1.25 mg tablet; Take 1 Tab by mouth daily. 4. Osteopenia, unspecified location  -     raloxifene (EVISTA) 60 mg tablet; TAKE 1 TABLET DAILY    5. Pure hypercholesterolemia  -     atorvastatin (LIPITOR) 40 mg tablet; Take 1 Tab by mouth daily.     6. Severe obesity (BMI 35.0-39.9) ([facility])    7. Elevated blood pressure reading in office with diagnosis of hypertension        Health Maintenance Due   Topic Date Due    MEDICARE YEARLY EXAM  05/09/2018

## 2018-07-26 NOTE — MR AVS SNAPSHOT
[address] [place] [address] 
[address] [place] [address] 
[phone] Patient: Wai Barker MRN:  [id number] Visit Information Date & Time Provider Department Dept. Phone Encounter #  
 7/26/2018  1:20 PM Berenice Chang MD [facility] [phone] [id number] Upcoming Health Maintenance Date Due  
 MEDICARE YEARLY EXAM 5/9/2018 BREAST CANCER SCRN MAMMOGRAM 8/26/2018* Influenza Age 5 to Adult 8/1/2018 GLAUCOMA SCREENING Q2Y 12/15/2018 DTaP/Tdap/Td series (2 - Td) 6/5/2025 COLONOSCOPY 4/27/2027 *Topic was postponed. The date shown is not the original due date. Allergies as of 7/26/2018  Review Complete On: 7/26/2018 By: Berenice Chang MD  
  
 Severity Noted Reaction Type Reactions Sulfa (Sulfonamide Antibiotics) High 01/25/2010    Rash, Itching Actonel [Risedronate]  01/25/2010    Other (comments) Gi upset Erythromycin  01/25/2010    Other (comments) GI upset Pcn [Penicillins]  01/25/2010    Other (comments) Pain in her hands Current Immunizations  Reviewed on 10/30/2017 Name Date Influenza High Dose Vaccine PF 10/13/2017, 10/19/2016, 10/2/2015 Influenza Vaccine 10/13/2014, 10/11/2013 Influenza Vaccine Split 10/23/2012, 11/1/2011, 10/10/2010 PPD 1/17/2011 Pneumococcal Conjugate (PCV-13) 3/17/2015 Pneumococcal Polysaccharide (PPSV-23) 12/15/2011, 12/1/2006 TD Vaccine 1/19/2011, 5/3/2001 Tdap 6/5/2015 Not reviewed this visit You Were Diagnosed With   
  
 Codes Comments Medicare annual wellness visit, subsequent    -  Primary ICD-10-CM: Z00.00 ICD-9-CM: V70.0 Advanced directives, counseling/discussion     ICD-10-CM: Z71.89 ICD-9-CM: V65.49 Essential hypertension     ICD-10-CM: I10 
ICD-9-CM: 401.9 Osteopenia, unspecified location     ICD-10-CM: M85.80 ICD-9-CM: 733.90  Pure hypercholesterolemia     ICD-10-CM: E78.00 
 ICD-9-CM: 272.0 Severe obesity (BMI 35.0-39.9) ([facility])     ICD-10-CM: E66.01 
ICD-9-CM: 278.01 Elevated blood pressure reading in office with diagnosis of hypertension     ICD-10-CM: I10 
ICD-9-CM: 401.9 Vitals BP Pulse Temp Resp Height(growth percentile) Weight(growth percentile) 155/81 (BP 1 Location: Right arm, BP Patient Position: Sitting) 66 96.7 °F (35.9 °C) (Oral) 14 5' 4\" (1.626 m) 204 lb 14.4 oz (92.9 kg) SpO2 BMI OB Status Smoking Status 92% 35.17 kg/m2 Postmenopausal Never Smoker Vitals History BMI and BSA Data Body Mass Index Body Surface Area  
 35.17 kg/m 2 2.05 m 2 Preferred Pharmacy Pharmacy Name Phone  [facility] [phone] Your Updated Medication List  
  
   
This list is accurate as of 7/26/18  2:25 PM.  Always use your most recent med list.  
  
  
  
  
 aspirin delayed-release 81 mg tablet Take 81 mg by mouth daily. m-w-f  
  
 atenolol 25 mg tablet Commonly known as:  TENORMIN  
TAKE 1 TABLET DAILY  
  
 atorvastatin 40 mg tablet Commonly known as:  LIPITOR Take 1 Tab by mouth daily. Biotin 2,500 mcg Cap Take 5,000 mcg by mouth daily. econazole nitrate 1 % topical cream  
Commonly known as:  Richie Maguire Apply  to affected area two (2) times a day. fexofenadine 180 mg tablet Commonly known as:  Avonne Day Take 180 mg by mouth. Take half tablet as needed  
  
 indapamide 1.25 mg tablet Commonly known as:  Hershall Cease Take 1 Tab by mouth daily. MULTIPLE VITAMIN PO Take  by mouth daily. raloxifene 60 mg tablet Commonly known as:  EVISTA TAKE 1 TABLET DAILY  
  
 VITAMIN D 2,000 unit Cap capsule Generic drug:  Cholecalciferol (Vitamin D3) Take 2,000 Units by mouth two (2) times a day. Prescriptions Sent to Mail Order Refills  
 atenolol (TENORMIN) 25 mg tablet 2 Sig: TAKE 1 TABLET DAILY Class: Mail Order Pharmacy: [address] Ph #: [phone]  
 raloxifene (EVISTA) 60 mg tablet 2 Sig: TAKE 1 TABLET DAILY Class: Mail Order Pharmacy: [address] Ph #: [phone] indapamide (LOZOL) 1.25 mg tablet 2 Sig: Take 1 Tab by mouth daily. Class: Mail Order Pharmacy: [address] Ph #: [phone] Route: Oral  
 atorvastatin (LIPITOR) 40 mg tablet 2 Sig: Take 1 Tab by mouth daily. Class: Mail Order Pharmacy: [address] Ph #: [phone] Route: Oral  
  
Patient Instructions Medicare Wellness Visit, Female The best way to live healthy is to have a lifestyle where you eat a well-balanced diet, exercise regularly, limit alcohol use, and quit all forms of tobacco/nicotine, if applicable. Regular preventive services are another way to keep healthy. Preventive services (vaccines, screening tests, monitoring & exams) can help personalize your care plan, which helps you manage your own care. Screening tests can find health problems at the earliest stages, when they are easiest to treat. [address] [facility] follows the current, evidence-based guidelines published by the [place] Jose Lata (USPSTF) when recommending preventive services for our patients. Because we follow these guidelines, sometimes recommendations change over time as research supports it. (For example, mammograms used to be recommended annually. Even though Medicare will still pay for an annual mammogram, the newer guidelines recommend a mammogram every two years for women of average risk.) Of course, you and your provider may decide to screen more often for some diseases, based on your risk and co-morbidities (chronic disease you are already diagnosed with). Preventive services for you include: - Medicare offers their members a free annual wellness visit, which is time for you and your primary care provider to discuss and plan for your preventive service needs. Take advantage of this benefit every year! 
 
-All people over age 72 should receive the recommended pneumonia vaccines. Current USPSTF guidelines recommend a series of two vaccines for the best pneumonia protection.  
 
-All adults should have a yearly flu vaccine and a tetanus vaccine every 10 years. All adults age 61 years should receive a shingles vaccine once in their lifetime.   
 
-A bone mass density test is recommended when a woman turns 65 to screen for osteoporosis. This test is only recommended once as a screening. Some providers will use this same test as a disease monitoring tool if you already have osteoporosis. -All adults age 38-68 years who are overweight should have a diabetes screening test once every three years.  
 
-Other screening tests & preventive services for persons with diabetes include: an eye exam to screen for diabetic retinopathy, a kidney function test, a foot exam, and stricter control over your cholesterol.  
 
-Cardiovascular screening for adults with routine risk involves an electrocardiogram (ECG) at intervals determined by the provider.  
 
-Colorectal cancer screenings should be done for adults age 54-65 years with normal risk. There are a number of acceptable methods of screening for this type of cancer. Each test has its own benefits and drawbacks. Discuss with your provider what is most appropriate for you during your annual wellness visit. The different tests include: colonoscopy (considered the best screening method), a fecal occult blood test, a fecal DNA test, and sigmoidoscopy. -Breast cancer screenings are recommended every other year for women of normal risk age 54-69 years.  
 
-Cervical cancer screenings for women over age 72 are only recommended with certain risk factors. -All adults born between [facility] should be screened once for Hepatitis C. Here is a list of your current Health Maintenance items (your personalized list of preventive services) with a due date: 
Health Maintenance Due Topic Date Due  
 Annual Well Visit  05/09/2018 Introducing [facility]! Dear Lorena Roberto: Thank you for requesting a [employer] account. Our records indicate that you already have an active [employer] account. You can access your account anytime at [email or web address] Did you know that you can access your hospital and ER discharge instructions at any time in [employer]? You can also review all of your test results from your hospital stay or ER visit. Additional Information If you have questions, please visit the Frequently Asked Questions section of the [employer] website at [email or web address] Remember, [employer] is NOT to be used for urgent needs. For medical emergencies, dial 911. Now available from your iPhone and Android! Please provide this summary of care documentation to your next provider. Your primary care clinician is listed as [id number] If you have any questions after today's visit, please call [phone].

## 2018-07-26 NOTE — PATIENT INSTRUCTIONS
Medicare Wellness Visit, Female    The best way to live healthy is to have a lifestyle where you eat a well-balanced diet, exercise regularly, limit alcohol use, and quit all forms of tobacco/nicotine, if applicable. Regular preventive services are another way to keep healthy. Preventive services (vaccines, screening tests, monitoring & exams) can help personalize your care plan, which helps you manage your own care. Screening tests can find health problems at the earliest stages, when they are easiest to treat. [address] [facility] [facility] follows the current, evidence-based guidelines published by the [facility] Jose Lata ([facility]) when recommending preventive services for our patients. Because we follow these guidelines, sometimes recommendations change over time as research supports it. (For example, mammograms used to be recommended annually. Even though Medicare will still pay for an annual mammogram, the newer guidelines recommend a mammogram every two years for women of average risk.)    Of course, you and your provider may decide to screen more often for some diseases, based on your risk and co-morbidities (chronic disease you are already diagnosed with). Preventive services for you include:    - Medicare offers their members a free annual wellness visit, which is time for you and your primary care provider to discuss and plan for your preventive service needs. Take advantage of this benefit every year!    -All people over age 72 should receive the recommended pneumonia vaccines. Current USPSTF guidelines recommend a series of two vaccines for the best pneumonia protection.     -All adults should have a yearly flu vaccine and a tetanus vaccine every 10 years. All adults age 61 years should receive a shingles vaccine once in their lifetime.      -A bone mass density test is recommended when a woman turns 65 to screen for osteoporosis.  This test is only recommended once as a screening. Some providers will use this same test as a disease monitoring tool if you already have osteoporosis. -All adults age 38-68 years who are overweight should have a diabetes screening test once every three years.     -Other screening tests & preventive services for persons with diabetes include: an eye exam to screen for diabetic retinopathy, a kidney function test, a foot exam, and stricter control over your cholesterol.     -Cardiovascular screening for adults with routine risk involves an electrocardiogram (ECG) at intervals determined by the provider.     -Colorectal cancer screenings should be done for adults age 54-65 years with normal risk. There are a number of acceptable methods of screening for this type of cancer. Each test has its own benefits and drawbacks. Discuss with your provider what is most appropriate for you during your annual wellness visit. The different tests include: colonoscopy (considered the best screening method), a fecal occult blood test, a fecal DNA test, and sigmoidoscopy. -Breast cancer screenings are recommended every other year for women of normal risk age 54-69 years.     -Cervical cancer screenings for women over age 72 are only recommended with certain risk factors.     -All adults born between [facility] should be screened once for Hepatitis C.      Here is a list of your current Health Maintenance items (your personalized list of preventive services) with a due date:  Health Maintenance Due   Topic Date Due    Annual Well Visit  05/09/2018

## 2018-07-30 ENCOUNTER — TELEPHONE (OUTPATIENT)
Dept: FAMILY MEDICINE CLINIC | Age: 73
End: 2018-07-30

## 2018-07-30 NOTE — TELEPHONE ENCOUNTER
Patient stated that she would like to have a bone density order put in. [facility] to have a mammogram done and wants to do both while she is there.

## 2018-07-31 DIAGNOSIS — M85.80 OSTEOPENIA, UNSPECIFIED LOCATION: Primary | ICD-10-CM

## 2018-07-31 DIAGNOSIS — M81.0 POSTMENOPAUSAL BONE LOSS: ICD-10-CM

## 2018-08-17 LAB — MAMMOGRAPHY, EXTERNAL: NORMAL

## 2018-12-13 DIAGNOSIS — E78.00 PURE HYPERCHOLESTEROLEMIA: ICD-10-CM

## 2018-12-13 RX ORDER — ATORVASTATIN CALCIUM 40 MG/1
40 TABLET, FILM COATED ORAL DAILY
Qty: 90 TAB | Refills: 1 | Status: SHIPPED | OUTPATIENT
Start: 2018-12-13 | End: 2019-06-21 | Stop reason: SDUPTHER

## 2018-12-13 NOTE — TELEPHONE ENCOUNTER
PCP: Leonard Faustin MD    Last appt: 7/26/2018  No future appointments. Requested Prescriptions     Pending Prescriptions Disp Refills    atorvastatin (LIPITOR) 40 mg tablet 90 Tab 2     Sig: Take 1 Tab by mouth daily.        Prior labs and Blood pressures:  BP Readings from Last 3 Encounters:   07/26/18 155/81   05/08/17 144/75   04/07/16 148/70     Lab Results   Component Value Date/Time    Sodium 142 04/18/2018 08:35 AM    Potassium 4.3 04/18/2018 08:35 AM    Chloride 99 04/18/2018 08:35 AM    CO2 25 04/18/2018 08:35 AM    Anion gap 5 07/20/2009 09:53 AM    Glucose 104 (H) 04/18/2018 08:35 AM    BUN 15 04/18/2018 08:35 AM    Creatinine 0.67 04/18/2018 08:35 AM    BUN/Creatinine ratio 22 04/18/2018 08:35 AM    GFR est AA 99 04/18/2018 08:35 AM    GFR est non-AA 86 04/18/2018 08:35 AM    Calcium 9.6 04/18/2018 08:35 AM     Lab Results   Component Value Date/Time    Hemoglobin A1c 6.0 (H) 04/18/2018 08:35 AM    Hemoglobin A1c (POC) 5.8 04/07/2016 11:02 AM     Lab Results   Component Value Date/Time    Cholesterol, total 173 04/18/2018 08:35 AM    HDL Cholesterol 70 04/18/2018 08:35 AM    LDL, calculated 80 04/18/2018 08:35 AM    VLDL, calculated 23 04/18/2018 08:35 AM    Triglyceride 113 04/18/2018 08:35 AM    CHOL/HDL Ratio 2.7 07/27/2010 09:38 AM     Lab Results   Component Value Date/Time    Vitamin D 25-Hydroxy 35.0 04/27/2011 02:16 PM    VITAMIN D, 25-HYDROXY 37.9 04/18/2018 08:35 AM       Lab Results   Component Value Date/Time    TSH 3.230 04/18/2018 08:35 AM

## 2019-06-14 DIAGNOSIS — I10 ESSENTIAL HYPERTENSION: ICD-10-CM

## 2019-06-15 NOTE — TELEPHONE ENCOUNTER
PCP: Cesar Montanez MD    Last appt: 7/26/2018  No future appointments.  SENT [employer] MESSAGE FOR PATIENT TO MAKE AN APPT    Requested Prescriptions     Pending Prescriptions Disp Refills    atenolol (TENORMIN) 25 mg tablet [Pharmacy Med Name: ATENOLOL TAB 25MG] 90 Tab 2     Sig: TAKE 1 TABLET DAILY    indapamide (LOZOL) 1.25 mg tablet [Pharmacy Med Name: INDAPAMIDE TAB 1.25MG] 90 Tab 2     Sig: TAKE 1 TABLET DAILY       Prior labs and Blood pressures:  BP Readings from Last 3 Encounters:   07/26/18 155/81   05/08/17 144/75   04/07/16 148/70     Lab Results   Component Value Date/Time    Sodium 142 04/18/2018 08:35 AM    Potassium 4.3 04/18/2018 08:35 AM    Chloride 99 04/18/2018 08:35 AM    CO2 25 04/18/2018 08:35 AM    Anion gap 5 07/20/2009 09:53 AM    Glucose 104 (H) 04/18/2018 08:35 AM    BUN 15 04/18/2018 08:35 AM    Creatinine 0.67 04/18/2018 08:35 AM    BUN/Creatinine ratio 22 04/18/2018 08:35 AM    GFR est AA 99 04/18/2018 08:35 AM    GFR est non-AA 86 04/18/2018 08:35 AM    Calcium 9.6 04/18/2018 08:35 AM     Lab Results   Component Value Date/Time    Hemoglobin A1c 6.0 (H) 04/18/2018 08:35 AM    Hemoglobin A1c (POC) 5.8 04/07/2016 11:02 AM     Lab Results   Component Value Date/Time    Cholesterol, total 173 04/18/2018 08:35 AM    HDL Cholesterol 70 04/18/2018 08:35 AM    LDL, calculated 80 04/18/2018 08:35 AM    VLDL, calculated 23 04/18/2018 08:35 AM    Triglyceride 113 04/18/2018 08:35 AM    CHOL/HDL Ratio 2.7 07/27/2010 09:38 AM     Lab Results   Component Value Date/Time    Vitamin D 25-Hydroxy 35.0 04/27/2011 02:16 PM    VITAMIN D, 25-HYDROXY 37.9 04/18/2018 08:35 AM       Lab Results   Component Value Date/Time    TSH 3.230 04/18/2018 08:35 AM

## 2019-06-19 DIAGNOSIS — R73.09 INCREASED GLUCOSE LEVEL: ICD-10-CM

## 2019-06-19 DIAGNOSIS — E66.01 SEVERE OBESITY WITH BODY MASS INDEX (BMI) OF 35.0 TO 39.9 WITH SERIOUS COMORBIDITY (HCC): ICD-10-CM

## 2019-06-19 DIAGNOSIS — R73.03 PREDIABETES: ICD-10-CM

## 2019-06-19 DIAGNOSIS — E78.00 PURE HYPERCHOLESTEROLEMIA: ICD-10-CM

## 2019-06-19 DIAGNOSIS — E55.9 VITAMIN D DEFICIENCY: ICD-10-CM

## 2019-06-19 DIAGNOSIS — I10 ESSENTIAL HYPERTENSION: Primary | ICD-10-CM

## 2019-06-19 RX ORDER — INDAPAMIDE 1.25 MG/1
TABLET, FILM COATED ORAL
Qty: 90 TAB | Refills: 0 | Status: SHIPPED | OUTPATIENT
Start: 2019-06-19 | End: 2019-07-29 | Stop reason: SDUPTHER

## 2019-06-19 RX ORDER — ATENOLOL 25 MG/1
TABLET ORAL
Qty: 90 TAB | Refills: 0 | Status: SHIPPED | OUTPATIENT
Start: 2019-06-19 | End: 2019-07-29 | Stop reason: SDUPTHER

## 2019-07-01 DIAGNOSIS — E66.01 SEVERE OBESITY WITH BODY MASS INDEX (BMI) OF 35.0 TO 39.9 WITH SERIOUS COMORBIDITY (HCC): ICD-10-CM

## 2019-07-01 DIAGNOSIS — R73.03 PREDIABETES: ICD-10-CM

## 2019-07-01 DIAGNOSIS — I10 ESSENTIAL HYPERTENSION: ICD-10-CM

## 2019-07-01 DIAGNOSIS — E55.9 VITAMIN D DEFICIENCY: ICD-10-CM

## 2019-07-01 DIAGNOSIS — R73.09 INCREASED GLUCOSE LEVEL: ICD-10-CM

## 2019-07-01 DIAGNOSIS — E78.00 PURE HYPERCHOLESTEROLEMIA: ICD-10-CM

## 2019-07-16 LAB
25(OH)D3+25(OH)D2 SERPL-MCNC: 45 NG/ML (ref 30–100)
ALBUMIN SERPL-MCNC: 4.1 G/DL (ref 3.5–4.8)
ALBUMIN/GLOB SERPL: 1.8 {RATIO} (ref 1.2–2.2)
ALP SERPL-CCNC: 54 IU/L (ref 39–117)
ALT SERPL-CCNC: 24 IU/L (ref 0–32)
APPEARANCE UR: CLEAR
AST SERPL-CCNC: 28 IU/L (ref 0–40)
BASOPHILS # BLD AUTO: 0.1 X10E3/UL (ref 0–0.2)
BASOPHILS NFR BLD AUTO: 1 %
BILIRUB SERPL-MCNC: 0.4 MG/DL (ref 0–1.2)
BILIRUB UR QL STRIP: NEGATIVE
BUN SERPL-MCNC: 11 MG/DL (ref 8–27)
BUN/CREAT SERPL: 17 (ref 12–28)
CALCIUM SERPL-MCNC: 9.7 MG/DL (ref 8.7–10.3)
CHLORIDE SERPL-SCNC: 101 MMOL/L (ref 96–106)
CHOLEST SERPL-MCNC: 167 MG/DL (ref 100–199)
CO2 SERPL-SCNC: 26 MMOL/L (ref 20–29)
COLOR UR: YELLOW
CREAT SERPL-MCNC: 0.64 MG/DL (ref 0.57–1)
EOSINOPHIL # BLD AUTO: 0.2 X10E3/UL (ref 0–0.4)
EOSINOPHIL NFR BLD AUTO: 3 %
ERYTHROCYTE [DISTWIDTH] IN BLOOD BY AUTOMATED COUNT: 13.8 % (ref 12.3–15.4)
EST. AVERAGE GLUCOSE BLD GHB EST-MCNC: 120 MG/DL
GLOBULIN SER CALC-MCNC: 2.3 G/DL (ref 1.5–4.5)
GLUCOSE SERPL-MCNC: 101 MG/DL (ref 65–99)
GLUCOSE UR QL: NEGATIVE
HBA1C MFR BLD: 5.8 % (ref 4.8–5.6)
HCT VFR BLD AUTO: 43.9 % (ref 34–46.6)
HDLC SERPL-MCNC: 68 MG/DL
HGB BLD-MCNC: 14.2 G/DL (ref 11.1–15.9)
HGB UR QL STRIP: NEGATIVE
IMM GRANULOCYTES # BLD AUTO: 0 X10E3/UL (ref 0–0.1)
IMM GRANULOCYTES NFR BLD AUTO: 0 %
INTERPRETATION, 910389: NORMAL
KETONES UR QL STRIP: NEGATIVE
LDLC SERPL CALC-MCNC: 78 MG/DL (ref 0–99)
LEUKOCYTE ESTERASE UR QL STRIP: NEGATIVE
LYMPHOCYTES # BLD AUTO: 1.8 X10E3/UL (ref 0.7–3.1)
LYMPHOCYTES NFR BLD AUTO: 28 %
MCH RBC QN AUTO: 29.7 PG (ref 26.6–33)
MCHC RBC AUTO-ENTMCNC: 32.3 G/DL (ref 31.5–35.7)
MCV RBC AUTO: 92 FL (ref 79–97)
MICRO URNS: ABNORMAL
MONOCYTES # BLD AUTO: 0.5 X10E3/UL (ref 0.1–0.9)
MONOCYTES NFR BLD AUTO: 8 %
NEUTROPHILS # BLD AUTO: 3.9 X10E3/UL (ref 1.4–7)
NEUTROPHILS NFR BLD AUTO: 60 %
NITRITE UR QL STRIP: NEGATIVE
PH UR STRIP: 8 [PH] (ref 5–7.5)
PLATELET # BLD AUTO: 248 X10E3/UL (ref 150–450)
POTASSIUM SERPL-SCNC: 4.1 MMOL/L (ref 3.5–5.2)
PROT SERPL-MCNC: 6.4 G/DL (ref 6–8.5)
PROT UR QL STRIP: NEGATIVE
RBC # BLD AUTO: 4.78 X10E6/UL (ref 3.77–5.28)
SODIUM SERPL-SCNC: 140 MMOL/L (ref 134–144)
SP GR UR: 1.01 (ref 1–1.03)
TRIGL SERPL-MCNC: 103 MG/DL (ref 0–149)
TSH SERPL DL<=0.005 MIU/L-ACNC: 2.45 UIU/ML (ref 0.45–4.5)
UROBILINOGEN UR STRIP-MCNC: 0.2 MG/DL (ref 0.2–1)
VLDLC SERPL CALC-MCNC: 21 MG/DL (ref 5–40)
WBC # BLD AUTO: 6.6 X10E3/UL (ref 3.4–10.8)

## 2019-07-29 ENCOUNTER — OFFICE VISIT (OUTPATIENT)
Dept: FAMILY MEDICINE CLINIC | Age: 74
End: 2019-07-29

## 2019-07-29 VITALS
WEIGHT: 202.6 LBS | DIASTOLIC BLOOD PRESSURE: 80 MMHG | RESPIRATION RATE: 20 BRPM | BODY MASS INDEX: 34.59 KG/M2 | HEIGHT: 64 IN | TEMPERATURE: 96.5 F | SYSTOLIC BLOOD PRESSURE: 138 MMHG | OXYGEN SATURATION: 95 % | HEART RATE: 74 BPM

## 2019-07-29 DIAGNOSIS — I10 ESSENTIAL HYPERTENSION: ICD-10-CM

## 2019-07-29 DIAGNOSIS — R73.03 PREDIABETES: ICD-10-CM

## 2019-07-29 DIAGNOSIS — E78.00 PURE HYPERCHOLESTEROLEMIA: ICD-10-CM

## 2019-07-29 DIAGNOSIS — M20.41 HAMMER TOE OF RIGHT FOOT: ICD-10-CM

## 2019-07-29 DIAGNOSIS — Z00.00 MEDICARE ANNUAL WELLNESS VISIT, SUBSEQUENT: Primary | ICD-10-CM

## 2019-07-29 DIAGNOSIS — I10 ELEVATED BLOOD PRESSURE READING IN OFFICE WITH DIAGNOSIS OF HYPERTENSION: ICD-10-CM

## 2019-07-29 DIAGNOSIS — M81.0 POSTMENOPAUSAL BONE LOSS: ICD-10-CM

## 2019-07-29 DIAGNOSIS — M85.80 OSTEOPENIA, UNSPECIFIED LOCATION: ICD-10-CM

## 2019-07-29 DIAGNOSIS — Z71.89 ADVANCED DIRECTIVES, COUNSELING/DISCUSSION: ICD-10-CM

## 2019-07-29 PROBLEM — E66.01 SEVERE OBESITY (BMI 35.0-39.9): Status: RESOLVED | Noted: 2018-07-26 | Resolved: 2019-07-29

## 2019-07-29 RX ORDER — RALOXIFENE HYDROCHLORIDE 60 MG/1
TABLET, FILM COATED ORAL
Qty: 90 TAB | Refills: 3 | Status: SHIPPED | OUTPATIENT
Start: 2019-07-29 | End: 2020-06-13

## 2019-07-29 RX ORDER — ATENOLOL 25 MG/1
TABLET ORAL
Qty: 90 TAB | Refills: 3 | Status: SHIPPED | OUTPATIENT
Start: 2019-07-29 | End: 2020-07-13 | Stop reason: SDUPTHER

## 2019-07-29 RX ORDER — ATORVASTATIN CALCIUM 40 MG/1
40 TABLET, FILM COATED ORAL DAILY
Qty: 90 TAB | Refills: 3 | Status: SHIPPED | OUTPATIENT
Start: 2019-07-29 | End: 2020-07-13 | Stop reason: SDUPTHER

## 2019-07-29 RX ORDER — INDAPAMIDE 1.25 MG/1
TABLET, FILM COATED ORAL
Qty: 90 TAB | Refills: 3 | Status: SHIPPED | OUTPATIENT
Start: 2019-07-29 | End: 2020-07-13 | Stop reason: SDUPTHER

## 2019-07-29 NOTE — ACP (ADVANCE CARE PLANNING)
Advance Care Planning    Advance Care Planning (ACP) Provider Conversation Snapshot    Date of ACP Conversation: 07/29/19  Persons included in Conversation:  patient  Length of ACP Conversation in minutes:  <16 minutes (Non-Billable)    Authorized Decision Maker (if patient is incapable of making informed decisions): This person is:    Other Legally Authorized Decision Maker (e.g. Next of Kin)  , daughter-Juliane, nephew-Germán          For Patients with Decision Making Capacity:   Values/Goals: Exploration of values, goals, and preferences if recovery is not expected, even with continued medical treatment in the event of:  Imminent death  Severe, permanent brain injury    Conversation Outcomes / Follow-Up Plan:   Reviewed existing Advance Directive

## 2019-07-29 NOTE — PROGRESS NOTES
Kenny Davis  Identified pt with two pt identifiers(name and ). Chief Complaint   Patient presents with   [address] [place] [address] Annual Wellness Visit    Results       1. Have you been to the ER, urgent care clinic since your last visit? Hospitalized since your last visit? No    2. Have you seen or consulted any other health care providers outside of the [address] [place] [address] since your last visit? Include any pap smears or colon screening. [facility] for annual eye exam,       Would you like to sign up for MyChart today, if you have not already done so? Patient has mychart  If not, would you like information on MyChart, and how to sign up at a later time? No      Medication reconciliation up to date and corrected with patient at this time. Today's provider has been notified of reason for visit, vitals and flowsheets obtained on patients. Reviewed record in preparation for visit, huddled with provider and have obtained necessary documentation.       Health Maintenance Due   Topic    GLAUCOMA SCREENING Q2Y     MEDICARE YEARLY EXAM     BREAST CANCER SCRN MAMMOGRAM        Wt Readings from Last 3 Encounters:   19 202 lb 9.6 oz (91.9 kg)   18 204 lb 14.4 oz (92.9 kg)   17 206 lb 12.8 oz (93.8 kg)     Temp Readings from Last 3 Encounters:   19 96.5 °F (35.8 °C) (Oral)   18 96.7 °F (35.9 °C) (Oral)   17 97.4 °F (36.3 °C)     BP Readings from Last 3 Encounters:   19 165/74   18 155/81   17 144/75     Pulse Readings from Last 3 Encounters:   19 74   18 66   17 79     Vitals:    19 1109   BP: 165/74   Pulse: 74   Resp: 20   Temp: 96.5 °F (35.8 °C)   TempSrc: Oral   SpO2: 95%   Weight: 202 lb 9.6 oz (91.9 kg)   Height: 5' 4.17\" (1.63 m)   PainSc:   0 - No pain         Learning Assessment:  :     Learning Assessment 2014   PRIMARY LEARNER Patient   PRIMARY LANGUAGE ENGLISH   LEARNER PREFERENCE PRIMARY READING   ANSWERED BY Eileen Dejesus gonzalez   RELATIONSHIP SELF       Depression Screening:  :     3 most recent PHQ Screens 7/29/2019   Little interest or pleasure in doing things Not at all   Feeling down, depressed, irritable, or hopeless Not at all   Total Score PHQ 2 0       Fall Risk Assessment:  :     Fall Risk Assessment, last 12 mths 7/29/2019   Able to walk? Yes   Fall in past 12 months? No       Abuse Screening:  :     Abuse Screening Questionnaire 7/29/2019 3/17/2015   Do you ever feel afraid of your partner? N N   Are you in a relationship with someone who physically or mentally threatens you? N N   Is it safe for you to go home?  Y Y       ADL Screening:  :     ADL Assessment 7/29/2019   Feeding yourself No Help Needed   Getting from bed to chair No Help Needed   Getting dressed No Help Needed   Bathing or showering No Help Needed   Walk across the room (includes cane/walker) No Help Needed   Using the telphone No Help Needed   Taking your medications No Help Needed   Preparing meals No Help Needed   Managing money (expenses/bills) No Help Needed   Moderately strenuous housework (laundry) No Help Needed   Shopping for personal items (toiletries/medicines) No Help Needed   Shopping for groceries No Help Needed   Driving No Help Needed   Climbing a flight of stairs No Help Needed   Getting to places beyond walking distances No Help Needed

## 2019-07-29 NOTE — PROGRESS NOTES
This is the Subsequent Medicare Annual Wellness Exam, performed 12 months or more after the Initial AWV or the last Subsequent AWV    I have reviewed the patient's medical history in detail and updated the computerized patient record. Eye doctor past yr. Dentist 3 x annually. Derm 2 x annually. Mammo annually. Colonoscopy '14.  10 yr repeat. DEXA '18. No signif hx past yr. History     Past Medical History:   Diagnosis Date    Abnormal CT scan 3/2015 eval by Dr Ryan Nuñez    ? infectious process on CT 6/1/16 f/u note  Pulmo Asso note 6/2/17    Acute low back pain 08/15/2017    [facility] visits to [facility] with Xrays on 2nd visit 8/18/17    Advance directive in chart 01/24/2012    Breast calcifications on mammogram     Cancer ([facility] [address] 1/2009    basal  cell dr Adriano Bauer q 6months 10/20/15    Dysphagia     Hiatal hernia     History of chicken pox     Hypercholesterolemia     Hypertension     Osteopenia     7/14/15 dexa still shows osteopenia    Screening for glaucoma 12/15/2016    [facility] w/IOP    Seasonal allergies     Shingles     age 50's    Shoulder pain, left     Skin exam, screening for cancer 04/12/2017    note from derm rec'd [place]    Tubular adenoma of colon 2/3/12    dr Katia Gonzales 5 yr repeat    Viral illness 12/23/14    Pt First note-flu like sx    Vitamin D deficiency       Past Surgical History:   Procedure Laterality Date    BREAST SURGERY PROCEDURE UNLISTED      history of several biopsies, college, and last one in 1's    COLONOSCOPY  2/3/12    5 yr repeat    ENDOSCOPY, COLON, DIAGNOSTIC  12/01    10 yr repeat.   [facility]    HX COLONOSCOPY  04/27/2017    [facility]    [facility]    HX ORTHOPAEDIC  1996    rotator cuff/rt shoulder reconstruction surgery    HX SKIN BIOPSY  1/2009    dr Adriano Bauer- basal cell     Current Outpatient Medications   Medication Sig Dispense Refill    atenolol (TENORMIN) 25 mg tablet TAKE 1 TABLET DAILY 90 Tab 3    atorvastatin (LIPITOR) 40 mg tablet Take 1 Tab by mouth daily. 90 Tab 3    indapamide (LOZOL) 1.25 mg tablet TAKE 1 TABLET DAILY 90 Tab 3    raloxifene (EVISTA) 60 mg tablet TAKE 1 TABLET DAILY 90 Tab 3    Biotin 2,500 mcg cap Take 5,000 mcg by mouth daily.  fexofenadine (ALLEGRA) 180 mg tablet Take 180 mg by mouth. Take half tablet as needed      Cholecalciferol, Vitamin D3, (VITAMIN D) 2,000 unit Cap Take 2,000 Units by mouth two (2) times a day.  aspirin delayed-release 81 mg tablet Take 81 mg by mouth daily. m-w-f       MULTIVITAMINS (MULTIPLE VITAMIN PO) Take  by mouth daily. Allergies   Allergen Reactions    Sulfa (Sulfonamide Antibiotics) Rash and Itching    Actonel [Risedronate] Other (comments)     Gi upset    Erythromycin Other (comments)     GI upset    Pcn [Penicillins] Other (comments)     Pain in her hands     Family History   Problem Relation Age of Onset    Heart Disease Mother     Heart Disease Father     Cancer Father         bladder    Cancer Brother         espohagus    Stroke Maternal Grandfather      Social History     Tobacco Use    Smoking status: Never Smoker    Smokeless tobacco: Never Used   Substance Use Topics    Alcohol use: Yes     Alcohol/week: 5.8 standard drinks     Types: 7 Glasses of wine per week     Patient Active Problem List   Diagnosis Code    Essential hypertension I10    Pure hypercholesterolemia E78.00    Osteopenia M85.80    Allergic rhinitis J30.9    Vitamin D deficiency E55.9    Toenail fungus B35.1    Incidental lung nodule, greater than or equal to 8mm R91.1    Prediabetes R73.03    Increased glucose level R73.09    Severe obesity (BMI 35.0-39. 9) E66.01    Elevated blood pressure reading in office with diagnosis of hypertension I10    Postmenopausal bone loss M81.0       Depression Risk Factor Screening:     3 most recent PHQ Screens 7/29/2019   Little interest or pleasure in doing things Not at all   Feeling down, depressed, irritable, or hopeless Not at all   Total Score PHQ 2 0     Alcohol Risk Factor Screening: You do not drink alcohol or very rarely. Functional Ability and Level of Safety:   Hearing Loss  Hearing is good. Activities of Daily Living  The home contains: grab bars and rugs  Patient does total self care    Fall Risk  Fall Risk Assessment, last 12 mths 7/29/2019   Able to walk? Yes   Fall in past 12 months? No       Abuse Screen  Patient is not abused    Cognitive Screening   Evaluation of Cognitive Function:  Has your family/caregiver stated any concerns about your memory: no  Normal    Patient Care Team   Patient Care Team:  Yoli Acosta MD as PCP - General  Precious Fry MD (Ophthalmology)  Laly Moffett MD (Gastroenterology)  Manuel Lewis MD (Dermatology)  Chanel Tai MD (Pulmonary Disease)  Germán Sesay MD (Colon and Rectal Surgery)    Assessment/Plan   Education and counseling provided:  Are appropriate based on today's review and evaluation  End-of-Life planning (with patient's consent)  Pneumococcal Vaccine  Influenza Vaccine  Screening Mammography  Colorectal cancer screening tests  Cardiovascular screening blood test  Bone mass measurement (DEXA)  Screening for glaucoma  Diabetes screening test    Diagnoses and all orders for this visit:    1. Medicare annual wellness visit, subsequent    2. Essential hypertension  -     atenolol (TENORMIN) 25 mg tablet; TAKE 1 TABLET DAILY  -     indapamide (LOZOL) 1.25 mg tablet; TAKE 1 TABLET DAILY    3. Pure hypercholesterolemia  -     atorvastatin (LIPITOR) 40 mg tablet; Take 1 Tab by mouth daily. 4. Osteopenia, unspecified location  -     raloxifene (EVISTA) 60 mg tablet; TAKE 1 TABLET DAILY    5. Advanced directives, counseling/discussion    6. Elevated blood pressure reading in office with diagnosis of hypertension    7. Prediabetes    8. Postmenopausal bone loss    9.  Hammer toe of right foot        Health Maintenance Due   Topic Date Due    GLAUCOMA SCREENING Q2Y  12/15/2018    MEDICARE YEARLY EXAM  07/27/2019

## 2019-07-29 NOTE — ACP (ADVANCE CARE PLANNING)
In the event something were to happen to you and you were unable to speak on your behalf, do you have an Advance Directive/ Living Will in place stating your wishes? YES    If yes, do we have a copy on file YES      Patient states that no changes have been made to ACP on file at this time.

## 2019-07-29 NOTE — PATIENT INSTRUCTIONS
Medicare Wellness Visit, Female     The best way to live healthy is to have a lifestyle where you eat a well-balanced diet, exercise regularly, limit alcohol use, and quit all forms of tobacco/nicotine, if applicable. Regular preventive services are another way to keep healthy. Preventive services (vaccines, screening tests, monitoring & exams) can help personalize your care plan, which helps you manage your own care. Screening tests can find health problems at the earliest stages, when they are easiest to treat. [facility] follows the current, evidence-based guidelines published by the [facility] ([facility]) when recommending preventive services for our patients. Because we follow these guidelines, sometimes recommendations change over time as research supports it. (For example, mammograms used to be recommended annually. Even though Medicare will still pay for an annual mammogram, the newer guidelines recommend a mammogram every two years for women of average risk.)  Of course, you and your doctor may decide to screen more often for some diseases, based on your risk and your health status. Preventive services for you include:  - Medicare offers their members a free annual wellness visit, which is time for you and your primary care provider to discuss and plan for your preventive service needs. Take advantage of this benefit every year!  -All adults over the age of 72 should receive the recommended pneumonia vaccines. Current USPSTF guidelines recommend a series of two vaccines for the best pneumonia protection.   -All adults should have a flu vaccine yearly and a tetanus vaccine every 10 years. All adults age 61 and older should receive a shingles vaccine once in their lifetime.    -A bone mass density test is recommended when a woman turns 65 to screen for osteoporosis. This test is only recommended one time, as a screening.  Some providers will use this same test as a disease monitoring tool if you already have osteoporosis. -All adults age 38-68 who are overweight should have a diabetes screening test once every three years.   -Other screening tests and preventive services for persons with diabetes include: an eye exam to screen for diabetic retinopathy, a kidney function test, a foot exam, and stricter control over your cholesterol.   -Cardiovascular screening for adults with routine risk involves an electrocardiogram (ECG) at intervals determined by your doctor.   -Colorectal cancer screenings should be done for adults age 54-65 with no increased risk factors for colorectal cancer. There are a number of acceptable methods of screening for this type of cancer. Each test has its own benefits and drawbacks. Discuss with your doctor what is most appropriate for you during your annual wellness visit. The different tests include: colonoscopy (considered the best screening method), a fecal occult blood test, a fecal DNA test, and sigmoidoscopy. -Breast cancer screenings are recommended every other year for women of normal risk, age 54-69.  -Cervical cancer screenings for women over age 72 are only recommended with certain risk factors.   -All adults born between [facility] should be screened once for Hepatitis C.      Here is a list of your current Health Maintenance items (your personalized list of preventive services) with a due date:  Health Maintenance Due   Topic Date Due    Glaucoma Screening   12/15/2018    Annual Well Visit  07/27/2019    Mammogram  08/17/2019

## 2020-04-11 LAB — SARS-COV-2, NAA: POSITIVE

## 2020-07-10 NOTE — TELEPHONE ENCOUNTER
The patient would like to have a lab appointment. Please call [phone]      The patient would like it the same day as her .

## 2020-07-13 ENCOUNTER — TELEPHONE (OUTPATIENT)
Dept: FAMILY MEDICINE CLINIC | Age: 75
End: 2020-07-13

## 2020-07-13 DIAGNOSIS — I10 ESSENTIAL HYPERTENSION: Primary | ICD-10-CM

## 2020-07-13 DIAGNOSIS — M85.80 OSTEOPENIA, UNSPECIFIED LOCATION: ICD-10-CM

## 2020-07-13 DIAGNOSIS — R73.09 INCREASED GLUCOSE LEVEL: ICD-10-CM

## 2020-07-13 DIAGNOSIS — E78.00 PURE HYPERCHOLESTEROLEMIA: ICD-10-CM

## 2020-07-13 DIAGNOSIS — E55.9 VITAMIN D DEFICIENCY: ICD-10-CM

## 2020-07-13 DIAGNOSIS — R73.03 PREDIABETES: ICD-10-CM

## 2020-07-13 RX ORDER — ATENOLOL 25 MG/1
TABLET ORAL
Qty: 90 TAB | Refills: 0 | Status: SHIPPED | OUTPATIENT
Start: 2020-07-13 | End: 2020-08-13 | Stop reason: SDUPTHER

## 2020-07-13 RX ORDER — INDAPAMIDE 1.25 MG/1
TABLET, FILM COATED ORAL
Qty: 90 TAB | Refills: 0 | Status: SHIPPED | OUTPATIENT
Start: 2020-07-13 | End: 2020-08-13 | Stop reason: SDUPTHER

## 2020-07-13 RX ORDER — ATORVASTATIN CALCIUM 40 MG/1
40 TABLET, FILM COATED ORAL DAILY
Qty: 90 TAB | Refills: 0 | Status: SHIPPED | OUTPATIENT
Start: 2020-07-13 | End: 2020-08-13 | Stop reason: SDUPTHER

## 2020-07-24 LAB
25(OH)D3+25(OH)D2 SERPL-MCNC: 39.3 NG/ML (ref 30–100)
ALBUMIN SERPL-MCNC: 4.2 G/DL (ref 3.7–4.7)
ALBUMIN/GLOB SERPL: 1.9 {RATIO} (ref 1.2–2.2)
ALP SERPL-CCNC: 47 IU/L (ref 39–117)
ALT SERPL-CCNC: 21 IU/L (ref 0–32)
APPEARANCE UR: CLEAR
AST SERPL-CCNC: 23 IU/L (ref 0–40)
BASOPHILS # BLD AUTO: 0.1 X10E3/UL (ref 0–0.2)
BASOPHILS NFR BLD AUTO: 1 %
BILIRUB SERPL-MCNC: 0.4 MG/DL (ref 0–1.2)
BILIRUB UR QL STRIP: NEGATIVE
BUN SERPL-MCNC: 14 MG/DL (ref 8–27)
BUN/CREAT SERPL: 22 (ref 12–28)
CALCIUM SERPL-MCNC: 9.2 MG/DL (ref 8.7–10.3)
CHLORIDE SERPL-SCNC: 101 MMOL/L (ref 96–106)
CHOLEST SERPL-MCNC: 153 MG/DL (ref 100–199)
CO2 SERPL-SCNC: 26 MMOL/L (ref 20–29)
COLOR UR: YELLOW
CREAT SERPL-MCNC: 0.63 MG/DL (ref 0.57–1)
EOSINOPHIL # BLD AUTO: 0.3 X10E3/UL (ref 0–0.4)
EOSINOPHIL NFR BLD AUTO: 4 %
ERYTHROCYTE [DISTWIDTH] IN BLOOD BY AUTOMATED COUNT: 14.3 % (ref 11.7–15.4)
EST. AVERAGE GLUCOSE BLD GHB EST-MCNC: 123 MG/DL
GLOBULIN SER CALC-MCNC: 2.2 G/DL (ref 1.5–4.5)
GLUCOSE SERPL-MCNC: 98 MG/DL (ref 65–99)
GLUCOSE UR QL: NEGATIVE
HBA1C MFR BLD: 5.9 % (ref 4.8–5.6)
HCT VFR BLD AUTO: 41.7 % (ref 34–46.6)
HDLC SERPL-MCNC: 67 MG/DL
HGB BLD-MCNC: 13.8 G/DL (ref 11.1–15.9)
HGB UR QL STRIP: NEGATIVE
IMM GRANULOCYTES # BLD AUTO: 0 X10E3/UL (ref 0–0.1)
IMM GRANULOCYTES NFR BLD AUTO: 0 %
INTERPRETATION, 910389: NORMAL
KETONES UR QL STRIP: NEGATIVE
LDLC SERPL CALC-MCNC: 68 MG/DL (ref 0–99)
LEUKOCYTE ESTERASE UR QL STRIP: NEGATIVE
LYMPHOCYTES # BLD AUTO: 2.2 X10E3/UL (ref 0.7–3.1)
LYMPHOCYTES NFR BLD AUTO: 29 %
MCH RBC QN AUTO: 31.2 PG (ref 26.6–33)
MCHC RBC AUTO-ENTMCNC: 33.1 G/DL (ref 31.5–35.7)
MCV RBC AUTO: 94 FL (ref 79–97)
MICRO URNS: NORMAL
MONOCYTES # BLD AUTO: 0.6 X10E3/UL (ref 0.1–0.9)
MONOCYTES NFR BLD AUTO: 8 %
NEUTROPHILS # BLD AUTO: 4.3 X10E3/UL (ref 1.4–7)
NEUTROPHILS NFR BLD AUTO: 58 %
NITRITE UR QL STRIP: NEGATIVE
PH UR STRIP: 7 [PH] (ref 5–7.5)
PLATELET # BLD AUTO: 226 X10E3/UL (ref 150–450)
POTASSIUM SERPL-SCNC: 3.8 MMOL/L (ref 3.5–5.2)
PROT SERPL-MCNC: 6.4 G/DL (ref 6–8.5)
PROT UR QL STRIP: NEGATIVE
RBC # BLD AUTO: 4.43 X10E6/UL (ref 3.77–5.28)
SODIUM SERPL-SCNC: 143 MMOL/L (ref 134–144)
SP GR UR: 1.02 (ref 1–1.03)
TRIGL SERPL-MCNC: 91 MG/DL (ref 0–149)
TSH SERPL DL<=0.005 MIU/L-ACNC: 3.91 UIU/ML (ref 0.45–4.5)
UROBILINOGEN UR STRIP-MCNC: 1 MG/DL (ref 0.2–1)
VLDLC SERPL CALC-MCNC: 18 MG/DL (ref 5–40)
WBC # BLD AUTO: 7.4 X10E3/UL (ref 3.4–10.8)

## 2020-07-30 ENCOUNTER — TELEPHONE (OUTPATIENT)
Dept: FAMILY MEDICINE CLINIC | Age: 75
End: 2020-07-30

## 2020-08-13 ENCOUNTER — VIRTUAL VISIT (OUTPATIENT)
Dept: FAMILY MEDICINE CLINIC | Age: 75
End: 2020-08-13
Payer: MEDICARE

## 2020-08-13 DIAGNOSIS — Z71.89 ADVANCED DIRECTIVES, COUNSELING/DISCUSSION: ICD-10-CM

## 2020-08-13 DIAGNOSIS — I10 ESSENTIAL HYPERTENSION: ICD-10-CM

## 2020-08-13 DIAGNOSIS — Z00.00 MEDICARE ANNUAL WELLNESS VISIT, SUBSEQUENT: Primary | ICD-10-CM

## 2020-08-13 DIAGNOSIS — M85.80 OSTEOPENIA, UNSPECIFIED LOCATION: ICD-10-CM

## 2020-08-13 DIAGNOSIS — E78.00 PURE HYPERCHOLESTEROLEMIA: ICD-10-CM

## 2020-08-13 PROCEDURE — G8421 BMI NOT CALCULATED: HCPCS | Performed by: FAMILY MEDICINE

## 2020-08-13 PROCEDURE — G8432 DEP SCR NOT DOC, RNG: HCPCS | Performed by: FAMILY MEDICINE

## 2020-08-13 PROCEDURE — 1101F PT FALLS ASSESS-DOCD LE1/YR: CPT | Performed by: FAMILY MEDICINE

## 2020-08-13 PROCEDURE — G8536 NO DOC ELDER MAL SCRN: HCPCS | Performed by: FAMILY MEDICINE

## 2020-08-13 PROCEDURE — G8427 DOCREV CUR MEDS BY ELIG CLIN: HCPCS | Performed by: FAMILY MEDICINE

## 2020-08-13 PROCEDURE — G0439 PPPS, SUBSEQ VISIT: HCPCS | Performed by: FAMILY MEDICINE

## 2020-08-13 PROCEDURE — G8756 NO BP MEASURE DOC: HCPCS | Performed by: FAMILY MEDICINE

## 2020-08-13 RX ORDER — ATENOLOL 25 MG/1
TABLET ORAL
Qty: 90 TAB | Refills: 2 | Status: SHIPPED | OUTPATIENT
Start: 2020-08-13 | End: 2021-04-05 | Stop reason: SDUPTHER

## 2020-08-13 RX ORDER — RALOXIFENE HYDROCHLORIDE 60 MG/1
TABLET, FILM COATED ORAL
Qty: 90 TAB | Refills: 2 | Status: SHIPPED | OUTPATIENT
Start: 2020-08-13 | End: 2021-04-05 | Stop reason: SDUPTHER

## 2020-08-13 RX ORDER — ATORVASTATIN CALCIUM 40 MG/1
40 TABLET, FILM COATED ORAL DAILY
Qty: 90 TAB | Refills: 2 | Status: SHIPPED | OUTPATIENT
Start: 2020-08-13 | End: 2021-04-05 | Stop reason: SDUPTHER

## 2020-08-13 RX ORDER — INDAPAMIDE 1.25 MG/1
TABLET, FILM COATED ORAL
Qty: 90 TAB | Refills: 2 | Status: SHIPPED | OUTPATIENT
Start: 2020-08-13 | End: 2021-04-05 | Stop reason: SDUPTHER

## 2020-08-13 NOTE — PROGRESS NOTES
Chief Complaint   Patient presents with   [address] Annual Wellness Visit       Patient-Reported Vitals 8/10/2020   Patient-Reported Weight 197   Patient-Reported Height 5'4\"   Patient-Reported Pulse 65   Patient-Reported Temperature 96.9   Patient-Reported SpO2 NA   Patient-Reported Systolic  824   Patient-Reported Diastolic 73   Patient-Reported Peak Flow NA   Patient-Reported LMP NA       Pain Scale: /10  Pain Location: This is the Subsequent Medicare Annual Wellness Exam, performed 12 months or more after the Initial AWV or the last Subsequent AWV    I have reviewed the patient's medical history in detail and updated the computerized patient record. Eye doctor 1-2 yrs. Appt Sept.  Dentist 1 x past yr. Derm 1-2 yrs. Mammo 1-2 yrs. Colonoscopy '14.  10 yr repeat. DEXA '18. No signif hx past yr. Had Shingles 10-20 yrs. History     Patient Active Problem List   Diagnosis Code    Essential hypertension I10    Pure hypercholesterolemia E78.00    Osteopenia M85.80    Allergic rhinitis J30.9    Vitamin D deficiency E55.9    Toenail fungus B35.1    Incidental lung nodule, greater than or equal to 8mm R91.1    Prediabetes R73.03    Increased glucose level R73.09    Elevated blood pressure reading in office with diagnosis of hypertension I10    Postmenopausal bone loss M81.0    Hammer toe of right foot M20.41     Past Medical History:   Diagnosis Date    Abnormal CT scan 3/2015 eval by Dr Almita Bennett    ?  infectious process on CT 6/1/16 f/u note  Pulmo Asso note 6/2/17    Acute low back pain 08/15/2017    2 visits to [facility] with Xrays on 2nd visit 8/18/17    Advance directive in chart 01/24/2012    Breast calcifications on mammogram     Cancer ([facility] [address] 1/2009    basal  cell dr Jeimy Baxter q 6months 10/20/15    Dysphagia     Hiatal hernia     History of chicken pox     Hypercholesterolemia     Hypertension     Osteopenia     7/14/15 dexa still shows osteopenia    Screening for glaucoma 12/15/2016    [facility] w/IOP    Seasonal allergies     Shingles     age 50's    Shoulder pain, left     Skin exam, screening for cancer 04/12/2017    note from derm rec'd [place]    Tubular adenoma of colon 2/3/12    dr Richard Yi 5 yr repeat    Viral illness 12/23/14    Pt First note-flu like sx    Vitamin D deficiency       Past Surgical History:   Procedure Laterality Date    BREAST SURGERY PROCEDURE UNLISTED      history of several biopsies, college, and last one in 1's    COLONOSCOPY  2/3/12    5 yr repeat    ENDOSCOPY, COLON, DIAGNOSTIC  12/01    10 yr repeat. [facility]    HX COLONOSCOPY  04/27/2017    [facility] [place] [facility] [place]    HX ORTHOPAEDIC  1996    rotator cuff/rt shoulder reconstruction surgery    HX SKIN BIOPSY  1/2009    dr Arthur Gong- basal cell     Current Outpatient Medications   Medication Sig Dispense Refill    atenoloL (TENORMIN) 25 mg tablet TAKE 1 TABLET DAILY 90 Tab 0    atorvastatin (LIPITOR) 40 mg tablet Take 1 Tab by mouth daily. 90 Tab 0    indapamide (LOZOL) 1.25 mg tablet TAKE 1 TABLET DAILY 90 Tab 0    raloxifene (EVISTA) 60 mg tablet TAKE 1 TABLET DAILY 90 Tab 0    Biotin 2,500 mcg cap Take 5,000 mcg by mouth daily.  fexofenadine (ALLEGRA) 180 mg tablet Take 180 mg by mouth. Take half tablet as needed      Cholecalciferol, Vitamin D3, (VITAMIN D) 2,000 unit Cap Take 2,000 Units by mouth two (2) times a day.  aspirin delayed-release 81 mg tablet Take 81 mg by mouth daily. m-w-f       MULTIVITAMINS (MULTIPLE VITAMIN PO) Take  by mouth daily.        Allergies   Allergen Reactions    Sulfa (Sulfonamide Antibiotics) Rash and Itching    Actonel [Risedronate] Other (comments)     Gi upset    Erythromycin Other (comments)     GI upset    Pcn [Penicillins] Other (comments)     Pain in her hands       Family History   Problem Relation Age of Onset    Heart Disease Mother     Heart Disease Father     Cancer Father bladder    Cancer Brother         espohagus    Stroke Maternal Grandfather      Social History     Tobacco Use    Smoking status: Never Smoker    Smokeless tobacco: Never Used   Substance Use Topics    Alcohol use: Yes     Alcohol/week: 5.8 standard drinks     Types: 7 Glasses of wine per week       Depression Risk Factor Screening:     3 most recent PHQ Screens 7/29/2019   Little interest or pleasure in doing things Not at all   Feeling down, depressed, irritable, or hopeless Not at all   Total Score PHQ 2 0       Alcohol Risk Factor Screening:   Do you average 1 drink per night or more than 7 drinks a week:  No    On any one occasion in the past three months have you have had more than 3 drinks containing alcohol:  No      Functional Ability and Level of Safety:   Hearing: Hearing is good. Activities of Daily Living: The home contains: grab bars and rugs  Patient does total self care     Ambulation: with no difficulty     Fall Risk:  Fall Risk Assessment, last 12 mths 7/29/2019   Able to walk? Yes   Fall in past 12 months? No     Abuse Screen:  Patient is not abused       Cognitive Screening   Has your family/caregiver stated any concerns about your memory: no    Cognitive Screening: ND    Patient Care Team   Patient Care Team:  Macy Louie MD as PCP - General  Kwesi Carter MD (Ophthalmology)  Phillip Caldwell MD (Gastroenterology)  Racquel Teixeira MD (Dermatology)  Brain Leaks, MD (Pulmonary Disease)  Elias Khanna MD (Colon and Rectal Surgery)    Assessment/Plan   Education and counseling provided:  Are appropriate based on today's review and evaluation  End-of-Life planning (with patient's consent)  Pneumococcal Vaccine  Influenza Vaccine  Screening Mammography  Colorectal cancer screening tests  Cardiovascular screening blood test  Bone mass measurement (DEXA)  Screening for glaucoma  Diabetes screening test    Diagnoses and all orders for this visit:    1.  Medicare annual wellness visit, subsequent    2. Essential hypertension  -     atenoloL (TENORMIN) 25 mg tablet; TAKE 1 TABLET DAILY  -     indapamide (LOZOL) 1.25 mg tablet; TAKE 1 TABLET DAILY    3. Pure hypercholesterolemia  -     atorvastatin (LIPITOR) 40 mg tablet; Take 1 Tab by mouth daily. 4. Osteopenia, unspecified location  -     raloxifene (EVISTA) 60 mg tablet; TAKE 1 TABLET DAILY        Health Maintenance Due   Topic Date Due    Influenza Age 5 to Adult  08/01/2020    Medicare Yearly Exam  07/29/2020       Lis Deleon, who was evaluated through a synchronous (real-time) audio-video encounter, and/or her healthcare decision maker, is aware that it is a billable service, with coverage as determined by her insurance carrier. She provided verbal consent to proceed: Yes, and patient identification was verified. It was conducted pursuant to the emergency declaration under the [address] [place] [address] authority and the [employer] General Act. A caregiver was present when appropriate. Ability to conduct physical exam was limited. I was at home. The patient was at home.     Francis Brenner MD

## 2020-08-13 NOTE — ACP (ADVANCE CARE PLANNING)
Advance Care Planning       Advance Care Planning (ACP) Physician/NP/PA (Provider) Conversation        Date of ACP Conversation: 8/13/2020    Conversation Conducted with:   Patient with Decision Making Isaac Demarco Maker:    Current Designated Health Care Decision Maker:   (If there is a valid [place] named in the [address]" box in the ACP activity, but it is not visible above, be sure to open that field and then select the health care decision maker relationship (ie \"primary\") in the blank space to the right of the name.)    Note: Assess and validate information in current ACP documents, as indicated. If no Authorized Decision Maker has previously been identified, then patient chooses [place]:  \"Who would you like to name as your primary health care decision-maker? \"    Name: Linh Parish   Relationship: Spouse  Phone number: [phone]  \"Can this person be reached easily? \" YES  \"Who would you like to name as your back-up decision maker? \"   Name: Seda Gallagher  Relationship: Daughter Phone number: [phone]  \"Can this person be reached easily? \" YES    Note: If the relationship of these Decision-Makers to the patient does NOT follow your state's Next of Kin hierarchy, recommend that patient complete ACP document that meets state-specific requirements to allow them to act on the patient's behalf when appropriate. Care Preferences:    Hospitalization: \"If your health worsens and it becomes clear that your chance of recovery is unlikely, what would your preference be regarding hospitalization? \"  If the patient would want hospitalization, answer \"yes\". If the patient would prefer comfort-focused treatment without hospitalization, answer \"no\". yes      Ventilation:   \"If you were in your present state of health and suddenly became very ill and were unable to breathe on your own, what would your preference be about the use of a ventilator (breathing machine) if it was available to you? \"    If patient would desire the use of a ventilator (breathing machine), answer \"yes\", if not answer \"no\":no    \"If your health worsens and it becomes clear that your chance of recovery is unlikely, what would your preference be about the use of a ventilator (breathing machine) if it was available to you? \"   no      Resuscitation:  \"CPR works best to restart the heart when there is a sudden event, like a heart attack, in someone who is otherwise healthy. Unfortunately, CPR does not typically restart the heart for people who have serious health conditions or who are very sick. \"    \"In the event your heart stopped as a result of an underlying serious health condition, would you want attempts to be made to restart your heart (answer \"yes\" for attempt to resuscitate) or would you prefer a natural death (answer \"no\" for do not attempt to resuscitate)? \"   yes    NOTE: If the patient has a valid advance directive AND provides care preference(s) that are inconsistent with that prior directive, advise the patient to consider either: creating a new advance directive that complies with state-specific requirements; or, if that is not possible, orally revoking that prior directive in accordance with state-specific requirements, which must be documented in the EHR.     Conversation Outcomes / Follow-Up Plan:   Has ACP      Length of Voluntary ACP Conversation in minutes:  <16 minutes (Non-Billable)      Dea Longo MD

## 2020-08-13 NOTE — PATIENT INSTRUCTIONS
Medicare Wellness Visit, Female The best way to live healthy is to have a lifestyle where you eat a well-balanced diet, exercise regularly, limit alcohol use, and quit all forms of tobacco/nicotine, if applicable. Regular preventive services are another way to keep healthy. Preventive services (vaccines, screening tests, monitoring & exams) can help personalize your care plan, which helps you manage your own care. Screening tests can find health problems at the earliest stages, when they are easiest to treat. [facility] follows the current, evidence-based guidelines published by the [facility] ([facility]) when recommending preventive services for our patients. Because we follow these guidelines, sometimes recommendations change over time as research supports it. (For example, mammograms used to be recommended annually. Even though Medicare will still pay for an annual mammogram, the newer guidelines recommend a mammogram every two years for women of average risk). Of course, you and your doctor may decide to screen more often for some diseases, based on your risk and your co-morbidities (chronic disease you are already diagnosed with). Preventive services for you include: - Medicare offers their members a free annual wellness visit, which is time for you and your primary care provider to discuss and plan for your preventive service needs. Take advantage of this benefit every year! 
-All adults over the age of 72 should receive the recommended pneumonia vaccines. Current USPSTF guidelines recommend a series of two vaccines for the best pneumonia protection.  
-All adults should have a flu vaccine yearly and a tetanus vaccine every 10 years.  
-All adults age 48 and older should receive the shingles vaccines (series of two vaccines). -All adults age 38-68 who are overweight should have a diabetes screening test once every three years. -All adults born between [birth year] and 1967 should be screened once for Hepatitis C. 
-Other screening tests and preventive services for persons with diabetes include: an eye exam to screen for diabetic retinopathy, a kidney function test, a foot exam, and stricter control over your cholesterol.  
-Cardiovascular screening for adults with routine risk involves an electrocardiogram (ECG) at intervals determined by your doctor.  
-Colorectal cancer screenings should be done for adults age 54-65 with no increased risk factors for colorectal cancer. There are a number of acceptable methods of screening for this type of cancer. Each test has its own benefits and drawbacks. Discuss with your doctor what is most appropriate for you during your annual wellness visit. The different tests include: colonoscopy (considered the best screening method), a fecal occult blood test, a fecal DNA test, and sigmoidoscopy. 
 
-A bone mass density test is recommended when a woman turns 65 to screen for osteoporosis. This test is only recommended one time, as a screening. Some providers will use this same test as a disease monitoring tool if you already have osteoporosis. -Breast cancer screenings are recommended every other year for women of normal risk, age 54-69. 
-Cervical cancer screenings for women over age 72 are only recommended with certain risk factors. Here is a list of your current Health Maintenance items (your personalized list of preventive services) with a due date: 
Health Maintenance Due Topic Date Due  
 Flu Vaccine  08/01/2020 [facility] Annual Well Visit  07/29/2020

## 2020-09-15 DIAGNOSIS — Z13.820 SCREENING FOR OSTEOPOROSIS: Primary | ICD-10-CM

## 2020-09-15 DIAGNOSIS — M81.0 POSTMENOPAUSAL BONE LOSS: ICD-10-CM

## 2020-10-09 DIAGNOSIS — E78.00 PURE HYPERCHOLESTEROLEMIA: ICD-10-CM

## 2020-10-09 DIAGNOSIS — I10 ESSENTIAL HYPERTENSION: ICD-10-CM

## 2020-10-10 RX ORDER — ATORVASTATIN CALCIUM 40 MG/1
40 TABLET, FILM COATED ORAL DAILY
Qty: 90 TAB | Refills: 2 | OUTPATIENT
Start: 2020-10-10

## 2020-10-10 RX ORDER — ATENOLOL 25 MG/1
TABLET ORAL
Qty: 90 TAB | Refills: 2 | OUTPATIENT
Start: 2020-10-10

## 2020-11-17 ENCOUNTER — VIRTUAL VISIT (OUTPATIENT)
Dept: FAMILY MEDICINE CLINIC | Age: 75
End: 2020-11-17
Payer: MEDICARE

## 2020-11-17 DIAGNOSIS — M85.80 OSTEOPENIA, UNSPECIFIED LOCATION: ICD-10-CM

## 2020-11-17 DIAGNOSIS — I10 HTN, GOAL BELOW 150/90: ICD-10-CM

## 2020-11-17 DIAGNOSIS — Z76.89 ENCOUNTER TO ESTABLISH CARE WITH NEW DOCTOR: Primary | ICD-10-CM

## 2020-11-17 PROBLEM — M81.0 POSTMENOPAUSAL BONE LOSS: Status: RESOLVED | Noted: 2018-07-31 | Resolved: 2020-11-17

## 2020-11-17 PROBLEM — R73.09 INCREASED GLUCOSE LEVEL: Status: RESOLVED | Noted: 2018-03-07 | Resolved: 2020-11-17

## 2020-11-17 PROCEDURE — 1090F PRES/ABSN URINE INCON ASSESS: CPT | Performed by: FAMILY MEDICINE

## 2020-11-17 PROCEDURE — G0463 HOSPITAL OUTPT CLINIC VISIT: HCPCS | Performed by: FAMILY MEDICINE

## 2020-11-17 PROCEDURE — 1101F PT FALLS ASSESS-DOCD LE1/YR: CPT | Performed by: FAMILY MEDICINE

## 2020-11-17 PROCEDURE — 99204 OFFICE O/P NEW MOD 45 MIN: CPT | Performed by: FAMILY MEDICINE

## 2020-11-17 PROCEDURE — G8427 DOCREV CUR MEDS BY ELIG CLIN: HCPCS | Performed by: FAMILY MEDICINE

## 2020-11-17 PROCEDURE — G8756 NO BP MEASURE DOC: HCPCS | Performed by: FAMILY MEDICINE

## 2020-11-17 PROCEDURE — G8432 DEP SCR NOT DOC, RNG: HCPCS | Performed by: FAMILY MEDICINE

## 2020-11-17 NOTE — PROGRESS NOTES
Aayush Edward, who was evaluated through a synchronous (real-time) audio-video encounter, and/or her healthcare decision maker, is aware that it is a billable service, with coverage as determined by her insurance carrier. She provided verbal consent to proceed: YES, and patient identification was verified. It was conducted pursuant to the emergency declaration under the [facility], [facility] authority and the [employer] and [employer] General Act. A caregiver was present when appropriate. Ability to conduct physical exam was limited. I was at home. The patient was at home. This virtual visit was conducted via Liquid. Pursuant to the emergency declaration under the [facility], [address] [place] [address] authority and the [employer] and [employer] Act, this Virtual  Visit was conducted to reduce the patient's risk of exposure to COVID-19 and provide continuity of care for an established patient. Services were provided through a video synchronous discussion virtually to substitute for in-person clinic visit. Due to this being a TeleHealth evaluation, many elements of the physical examination are unable to be assessed. Total Time: minutes: 11-20 minutes. Lisa Vazquez MD    712  Subjective:   Aayush Edward was seen for New Patient    Hx of osteopenia. Has been on Evista for a long time. Did not tolerate Fosamax and Actonel due to GI upset. Has an upcoming DEXA and mammogram scheduled at [facility]. Hx of benign breast biopsies in the past; no personal hx or family hx of breast cancer. Has a wrist BP cuff at home which has normal BP readings. BP Readings from Last 3 Encounters:   07/29/19 138/80   07/26/18 155/81   05/08/17 144/75       Prior to Admission medications    Medication Sig Start Date End Date Taking?  Authorizing Provider   atenoloL (TENORMIN) 25 mg tablet TAKE 1 TABLET DAILY 8/13/20  Yes Read, Penelope Fletcher MD   atorvastatin (LIPITOR) 40 mg tablet Take 1 Tab by mouth daily. 8/13/20  Yes Read, Penelope Fletcher MD   indapamide (LOZOL) 1.25 mg tablet TAKE 1 TABLET DAILY 8/13/20  Yes Tati, Penelope Fletcher MD   raloxifene (EVISTA) 60 mg tablet TAKE 1 TABLET DAILY 8/13/20  Yes Read, Penelope Fletcher MD   Biotin 2,500 mcg cap Take 5,000 mcg by mouth daily. Yes Provider, Historical   fexofenadine (ALLEGRA) 180 mg tablet Take 180 mg by mouth. Take half tablet as needed   Yes Provider, Historical   Cholecalciferol, Vitamin D3, (VITAMIN D) 2,000 unit Cap Take 2,000 Units by mouth two (2) times a day. Yes Provider, Historical   aspirin delayed-release 81 mg tablet Take 81 mg by mouth daily. m-w-f    Yes Provider, Historical   MULTIVITAMINS (MULTIPLE VITAMIN PO) Take  by mouth daily. Yes Provider, Historical       Allergies   Allergen Reactions    Sulfa (Sulfonamide Antibiotics) Rash and Itching    Actonel [Risedronate] Other (comments)     Gi upset    Erythromycin Other (comments)     GI upset    Pcn [Penicillins] Other (comments)     Pain in her hands       ROS      Past Medical History:   Diagnosis Date    Abnormal CT scan 3/2015 eval by Dr Jono Collazo    ?  infectious process on CT 6/1/16 f/u note  Pulmo Asso note 6/2/17    Advance directive in chart 01/24/2012    BCC (basal cell carcinoma) 01/2009    basal  cell dr Flores Line q 6months 10/20/15    Breast calcifications on mammogram     Dysphagia     Hiatal hernia     History of chicken pox     Hypercholesterolemia     Osteopenia     7/14/15 dexa still shows osteopenia    Screening for glaucoma 12/15/2016    [facility] w[facility]IOP    Seasonal allergies     Shingles     age 50's    Tubular adenoma of colon 2/3/12    dr Xi Tomas 5 yr repeat    Vitamin D deficiency        Past Surgical History:   Procedure Laterality Date    BREAST SURGERY PROCEDURE UNLISTED      history of several biopsies, college, and last one in 1990's    COLONOSCOPY  2/3/12    5 yr repeat    ENDOSCOPY, COLON, DIAGNOSTIC  12/01    10 yr repeat. [place]    HX COLONOSCOPY  04/27/2017    10 yr repeat [address] HX GYN  1986    c section    HX ORTHOPAEDIC  1996    rotator cuff/rt shoulder reconstruction surgery    HX SKIN BIOPSY  1/2009    dr Pat Norton- basal cell       Family History   Problem Relation Age of Onset    Heart Disease Mother     Heart Disease Father     Cancer Father         bladder    Cancer Brother         espohagus    Stroke Maternal Grandfather        Social History     Socioeconomic History    Marital status:      Spouse name: Not on file    Number of children: Not on file    Years of education: Not on file    Highest education level: Not on file   Occupational History    Not on file   Social Needs    Financial resource strain: Not on file    Food insecurity     Worry: Not on file     Inability: Not on file    Transportation needs     Medical: Not on file     Non-medical: Not on file   Tobacco Use    Smoking status: Never Smoker    Smokeless tobacco: Never Used   Substance and Sexual Activity    Alcohol use:  Yes     Alcohol/week: 5.8 standard drinks     Types: 7 Glasses of wine per week    Drug use: No    Sexual activity: Not on file   Lifestyle    Physical activity     Days per week: Not on file     Minutes per session: Not on file    Stress: Not on file   Relationships    Social connections     Talks on phone: Not on file     Gets together: Not on file     Attends [employer] service: Not on file     Active member of club or organization: Not on file     Attends meetings of clubs or organizations: Not on file     Relationship status: Not on file    Intimate partner violence     Fear of current or ex partner: Not on file     Emotionally abused: Not on file     Physically abused: Not on file     Forced sexual activity: Not on file   Other Topics Concern    Not on file   Social History Narrative    Not on file Physical Exam:     Visit Vitals  LMP  (LMP Unknown)        General: alert, cooperative, no distress   Mental  status: normal mood, behavior, speech, dress, motor activity, and thought processes, able to follow commands   HENT: NCAT   Neck: no visualized mass   Resp: no respiratory distress   Neuro: no gross deficits   Skin: no discoloration or lesions of concern on visible areas   Psychiatric: normal affect, consistent with stated mood, no evidence of hallucinations       Assessment & Plan:     Mario Foley is a 66 y.o. female who presents today for:    1. Encounter to establish care with new doctor    2. HTN, goal below 150/90  Stable, continue current treatment.  [facility]    3. Osteopenia, unspecified location  Pt to let me know when she completes her DEXA/mammogram at [facility] so we can request the records. May consider drug holiday if she does not meet criteria for osteoporosis treatment. There are no discontinued medications. Follow-up and Dispositions    · Return in about 6 months (around 5/17/2021) for HTN follow up. Treatment risks/benefits/costs/interactions/alternatives discussed with patient. Advised patient to call back or return to office if symptoms worsen/change/persist. If patient cannot reach us or should anything more severe/urgent arise he/she should proceed directly to the nearest emergency department. Discussed expected course/resolution/complications of diagnosis in detail with patient. Patient expressed understanding with the diagnosis and plan. Ganesh James M.D.

## 2021-04-05 ENCOUNTER — PATIENT MESSAGE (OUTPATIENT)
Dept: FAMILY MEDICINE CLINIC | Age: 76
End: 2021-04-05

## 2021-04-05 DIAGNOSIS — M85.80 OSTEOPENIA, UNSPECIFIED LOCATION: ICD-10-CM

## 2021-04-05 DIAGNOSIS — E78.00 PURE HYPERCHOLESTEROLEMIA: ICD-10-CM

## 2021-04-05 DIAGNOSIS — I10 ESSENTIAL HYPERTENSION: ICD-10-CM

## 2021-04-05 RX ORDER — ATENOLOL 25 MG/1
TABLET ORAL
Qty: 90 TAB | Refills: 2 | Status: SHIPPED | OUTPATIENT
Start: 2021-04-05 | End: 2021-09-27 | Stop reason: SDUPTHER

## 2021-04-05 RX ORDER — INDAPAMIDE 1.25 MG/1
TABLET, FILM COATED ORAL
Qty: 90 TAB | Refills: 2 | Status: SHIPPED | OUTPATIENT
Start: 2021-04-05 | End: 2021-09-27 | Stop reason: SDUPTHER

## 2021-04-05 RX ORDER — RALOXIFENE HYDROCHLORIDE 60 MG/1
TABLET, FILM COATED ORAL
Qty: 90 TAB | Refills: 2 | Status: SHIPPED | OUTPATIENT
Start: 2021-04-05 | End: 2021-09-27 | Stop reason: SDUPTHER

## 2021-04-05 RX ORDER — ATORVASTATIN CALCIUM 40 MG/1
40 TABLET, FILM COATED ORAL DAILY
Qty: 90 TAB | Refills: 2 | Status: SHIPPED | OUTPATIENT
Start: 2021-04-05 | End: 2021-09-27 | Stop reason: SDUPTHER

## 2021-08-24 ENCOUNTER — APPOINTMENT (OUTPATIENT)
Dept: FAMILY MEDICINE CLINIC | Age: 76
End: 2021-08-24

## 2021-08-24 DIAGNOSIS — E55.9 VITAMIN D DEFICIENCY: ICD-10-CM

## 2021-08-24 DIAGNOSIS — R73.03 PREDIABETES: ICD-10-CM

## 2021-08-24 DIAGNOSIS — E78.00 PURE HYPERCHOLESTEROLEMIA: ICD-10-CM

## 2021-08-24 DIAGNOSIS — M85.80 OSTEOPENIA, UNSPECIFIED LOCATION: ICD-10-CM

## 2021-08-24 LAB
25(OH)D3 SERPL-MCNC: 33.5 NG/ML (ref 30–100)
ALBUMIN SERPL-MCNC: 3.7 G/DL (ref 3.5–5)
ALBUMIN/GLOB SERPL: 1.3 {RATIO} (ref 1.1–2.2)
ALP SERPL-CCNC: 58 U/L (ref 45–117)
ALT SERPL-CCNC: 35 U/L (ref 12–78)
ANION GAP SERPL CALC-SCNC: 3 MMOL/L (ref 5–15)
AST SERPL-CCNC: 19 U/L (ref 15–37)
BILIRUB SERPL-MCNC: 0.6 MG/DL (ref 0.2–1)
BUN SERPL-MCNC: 12 MG/DL (ref 6–20)
BUN/CREAT SERPL: 22 (ref 12–20)
CALCIUM SERPL-MCNC: 9.4 MG/DL (ref 8.5–10.1)
CHLORIDE SERPL-SCNC: 106 MMOL/L (ref 97–108)
CHOLEST SERPL-MCNC: 180 MG/DL
CO2 SERPL-SCNC: 31 MMOL/L (ref 21–32)
CREAT SERPL-MCNC: 0.55 MG/DL (ref 0.55–1.02)
ERYTHROCYTE [DISTWIDTH] IN BLOOD BY AUTOMATED COUNT: 15.2 % (ref 11.5–14.5)
EST. AVERAGE GLUCOSE BLD GHB EST-MCNC: 120 MG/DL
GLOBULIN SER CALC-MCNC: 2.9 G/DL (ref 2–4)
GLUCOSE SERPL-MCNC: 102 MG/DL (ref 65–100)
HBA1C MFR BLD: 5.8 % (ref 4–5.6)
HCT VFR BLD AUTO: 43.9 % (ref 35–47)
HDLC SERPL-MCNC: 78 MG/DL
HDLC SERPL: 2.3 {RATIO} (ref 0–5)
HGB BLD-MCNC: 14.3 G/DL (ref 11.5–16)
LDLC SERPL CALC-MCNC: 80.4 MG/DL (ref 0–100)
MCH RBC QN AUTO: 30.5 PG (ref 26–34)
MCHC RBC AUTO-ENTMCNC: 32.6 G/DL (ref 30–36.5)
MCV RBC AUTO: 93.6 FL (ref 80–99)
NRBC # BLD: 0 K/UL (ref 0–0.01)
NRBC BLD-RTO: 0 PER 100 WBC
PLATELET # BLD AUTO: 244 K/UL (ref 150–400)
PMV BLD AUTO: 10.3 FL (ref 8.9–12.9)
POTASSIUM SERPL-SCNC: 4 MMOL/L (ref 3.5–5.1)
PROT SERPL-MCNC: 6.6 G/DL (ref 6.4–8.2)
RBC # BLD AUTO: 4.69 M/UL (ref 3.8–5.2)
SODIUM SERPL-SCNC: 140 MMOL/L (ref 136–145)
TRIGL SERPL-MCNC: 108 MG/DL (ref ?–150)
VLDLC SERPL CALC-MCNC: 21.6 MG/DL
WBC # BLD AUTO: 7.2 K/UL (ref 3.6–11)

## 2021-09-27 ENCOUNTER — OFFICE VISIT (OUTPATIENT)
Dept: FAMILY MEDICINE CLINIC | Age: 76
End: 2021-09-27
Payer: MEDICARE

## 2021-09-27 VITALS
DIASTOLIC BLOOD PRESSURE: 85 MMHG | RESPIRATION RATE: 16 BRPM | TEMPERATURE: 97.7 F | BODY MASS INDEX: 35.07 KG/M2 | HEIGHT: 64 IN | OXYGEN SATURATION: 98 % | SYSTOLIC BLOOD PRESSURE: 152 MMHG | HEART RATE: 98 BPM | WEIGHT: 205.4 LBS

## 2021-09-27 DIAGNOSIS — M85.80 OSTEOPENIA, UNSPECIFIED LOCATION: ICD-10-CM

## 2021-09-27 DIAGNOSIS — Z00.00 ENCOUNTER FOR MEDICARE ANNUAL WELLNESS EXAM: Primary | ICD-10-CM

## 2021-09-27 DIAGNOSIS — E78.00 PURE HYPERCHOLESTEROLEMIA: ICD-10-CM

## 2021-09-27 DIAGNOSIS — I10 ESSENTIAL HYPERTENSION: ICD-10-CM

## 2021-09-27 PROCEDURE — 99213 OFFICE O/P EST LOW 20 MIN: CPT | Performed by: FAMILY MEDICINE

## 2021-09-27 PROCEDURE — G0463 HOSPITAL OUTPT CLINIC VISIT: HCPCS | Performed by: FAMILY MEDICINE

## 2021-09-27 PROCEDURE — G8510 SCR DEP NEG, NO PLAN REQD: HCPCS | Performed by: FAMILY MEDICINE

## 2021-09-27 PROCEDURE — G8754 DIAS BP LESS 90: HCPCS | Performed by: FAMILY MEDICINE

## 2021-09-27 PROCEDURE — G8536 NO DOC ELDER MAL SCRN: HCPCS | Performed by: FAMILY MEDICINE

## 2021-09-27 PROCEDURE — 1090F PRES/ABSN URINE INCON ASSESS: CPT | Performed by: FAMILY MEDICINE

## 2021-09-27 PROCEDURE — G8427 DOCREV CUR MEDS BY ELIG CLIN: HCPCS | Performed by: FAMILY MEDICINE

## 2021-09-27 PROCEDURE — G8753 SYS BP > OR = 140: HCPCS | Performed by: FAMILY MEDICINE

## 2021-09-27 PROCEDURE — 1101F PT FALLS ASSESS-DOCD LE1/YR: CPT | Performed by: FAMILY MEDICINE

## 2021-09-27 PROCEDURE — G8399 PT W/DXA RESULTS DOCUMENT: HCPCS | Performed by: FAMILY MEDICINE

## 2021-09-27 PROCEDURE — G0439 PPPS, SUBSEQ VISIT: HCPCS | Performed by: FAMILY MEDICINE

## 2021-09-27 PROCEDURE — G8417 CALC BMI ABV UP PARAM F/U: HCPCS | Performed by: FAMILY MEDICINE

## 2021-09-27 RX ORDER — RALOXIFENE HYDROCHLORIDE 60 MG/1
TABLET, FILM COATED ORAL
Qty: 90 TABLET | Refills: 2 | Status: SHIPPED | OUTPATIENT
Start: 2021-09-27 | End: 2022-01-21 | Stop reason: SDUPTHER

## 2021-09-27 RX ORDER — INDAPAMIDE 2.5 MG/1
2.5 TABLET, FILM COATED ORAL DAILY
Qty: 90 TABLET | Refills: 2 | Status: SHIPPED | OUTPATIENT
Start: 2021-09-27 | End: 2022-01-21 | Stop reason: SDUPTHER

## 2021-09-27 RX ORDER — ATORVASTATIN CALCIUM 20 MG/1
20 TABLET, FILM COATED ORAL DAILY
Qty: 90 TABLET | Refills: 2 | Status: SHIPPED | OUTPATIENT
Start: 2021-09-27 | End: 2022-01-21 | Stop reason: SDUPTHER

## 2021-09-27 RX ORDER — ATENOLOL 25 MG/1
TABLET ORAL
Qty: 90 TABLET | Refills: 2 | Status: SHIPPED | OUTPATIENT
Start: 2021-09-27 | End: 2022-01-21 | Stop reason: SDUPTHER

## 2021-09-27 NOTE — PROGRESS NOTES
Chief Complaint   Patient presents with   [facility] Annual Wellness Visit       1. Have you been to the ER, urgent care clinic since your last visit? Hospitalized since your last visit? No    2. Have you seen or consulted any other health care providers outside of the [address] [place] [address] [place] since your last visit? Include any pap smears or colon screening. Yes When: 04/21 Where: [address] [facility]  Reason for visit: annual check up and mole removal     3. For patients over 45: Has the patient had a colonoscopy? Yes, HM satisfied with blue hyperlink     If the patient is female:    4. For patients over 40: Has the patient had a mammogram? Yes, HM satisfied with blue hyperlink    5. For patients over 21: Has the patient had a pap smear? Yes, HM satisfied with blue hyperlink    3 most recent PHQ Screens 9/27/2021   Little interest or pleasure in doing things Not at all   Feeling down, depressed, irritable, or hopeless Not at all   Total Score PHQ 2 0       Fall Risk Assessment, last 12 mths 9/27/2021   Able to walk? Yes   Fall in past 12 months? 0   Do you feel unsteady?  0   Are you worried about falling 0       ADL Assessment 9/27/2021   Feeding yourself No Help Needed   Getting from bed to chair No Help Needed   Getting dressed No Help Needed   Bathing or showering No Help Needed   Walk across the room (includes cane/walker) No Help Needed   Using the telphone No Help Needed   Taking your medications No Help Needed   Preparing meals No Help Needed   Managing money (expenses/bills) No Help Needed   Moderately strenuous housework (laundry) No Help Needed   Shopping for personal items (toiletries/medicines) No Help Needed   Shopping for groceries No Help Needed   Driving No Help Needed   Climbing a flight of stairs No Help Needed   Getting to places beyond walking distances No Help Needed

## 2021-09-27 NOTE — PROGRESS NOTES
Patient Name: Avis Silverio   MRN: [id number]    Angel Luis Ascencio is a 64 y.o. female who presents with the following:     BP is usually normal at home. Uses a wrist cuff. Labs notable for pre-dm. Would like to decrease statin dose if possible. Wt Readings from Last 3 Encounters:   09/27/21 205 lb 6.4 oz (93.2 kg)   07/29/19 202 lb 9.6 oz (91.9 kg)   07/26/18 204 lb 14.4 oz (92.9 kg)       BP Readings from Last 3 Encounters:   09/27/21 (!) 152/85   07/29/19 138/80   07/26/18 155/81     Review of Systems   Constitutional: Negative for fever, malaise/fatigue and weight loss. Respiratory: Negative for cough, hemoptysis, shortness of breath and wheezing. Cardiovascular: Negative for chest pain, palpitations, leg swelling and PND. Gastrointestinal: Negative for abdominal pain, constipation, diarrhea, nausea and vomiting. The patient's medications, allergies, past medical history, surgical history, family history and social history were reviewed and updated where appropriate. Current Outpatient Medications:     raloxifene (EVISTA) 60 mg tablet, TAKE 1 TABLET DAILY, Disp: 90 Tab, Rfl: 2    indapamide (LOZOL) 1.25 mg tablet, TAKE 1 TABLET DAILY, Disp: 90 Tab, Rfl: 2    atenoloL (TENORMIN) 25 mg tablet, TAKE 1 TABLET DAILY, Disp: 90 Tab, Rfl: 2    atorvastatin (LIPITOR) 40 mg tablet, Take 1 Tab by mouth daily. , Disp: 90 Tab, Rfl: 2    Biotin 2,500 mcg cap, Take 5,000 mcg by mouth daily. , Disp: , Rfl:     fexofenadine (ALLEGRA) 180 mg tablet, Take 180 mg by mouth. Take half tablet as needed, Disp: , Rfl:     Cholecalciferol, Vitamin D3, (VITAMIN D) 2,000 unit Cap, Take 2,000 Units by mouth two (2) times a day., Disp: , Rfl:     aspirin delayed-release 81 mg tablet, Take 81 mg by mouth daily. m-w-f , Disp: , Rfl:     MULTIVITAMINS (MULTIPLE VITAMIN PO), Take  by mouth daily. , Disp: , Rfl:     Allergies   Allergen Reactions    Sulfa (Sulfonamide Antibiotics) Rash and Itching    Actonel [Risedronate] Other (comments)     Gi upset    Erythromycin Other (comments)     GI upset    Pcn [Penicillins] Other (comments)     Pain in her hands         OBJECTIVE    Visit Vitals  BP (!) 152/85 (BP 1 Location: Left upper arm, BP Patient Position: Semi fowlers, BP Cuff Size: Large adult)   Pulse 98   Temp 97.7 °F (36.5 °C) (Temporal)   Resp 16   Ht 5' 4\" (1.626 m)   Wt 205 lb 6.4 oz (93.2 kg)   LMP  (LMP Unknown)   SpO2 98%   BMI 35.26 kg/m²       Physical Exam  Vitals and nursing note reviewed. Constitutional:       General: She is not in acute distress. Appearance: She is not diaphoretic. Eyes:      Conjunctiva/sclera: Conjunctivae normal.      Pupils: Pupils are equal, round, and reactive to light. Cardiovascular:      Rate and Rhythm: Normal rate and regular rhythm. Heart sounds: Normal heart sounds. No murmur heard. No friction rub. No gallop. Pulmonary:      Effort: Pulmonary effort is normal. No respiratory distress. Breath sounds: Normal breath sounds. No wheezing. Skin:     General: Skin is warm and dry. Neurological:      Mental Status: She is alert. ASSESSMENT AND PLAN  Charanjit Galvez is a 64 y.o. female who presents today for:    1. Encounter for Medicare annual wellness exam    2. Pure hypercholesterolemia  Decreased to 20 mg daily. - atorvastatin (LIPITOR) 20 mg tablet; Take 1 Tablet by mouth daily. Dose change  Dispense: 90 Tablet; Refill: 2    3. Essential hypertension  Increase indapamide to 2.5 mg daily. - indapamide (LOZOL) 2.5 mg tablet; Take 1 Tablet by mouth daily. Dose change  Dispense: 90 Tablet; Refill: 2  - atenoloL (TENORMIN) 25 mg tablet; TAKE 1 TABLET DAILY  Dispense: 90 Tablet; Refill: 2    4. Osteopenia, unspecified location  Stable, continue current treatment. - raloxifene (EVISTA) 60 mg tablet; TAKE 1 TABLET DAILY  Dispense: 90 Tablet;  Refill: 2       Medications Discontinued During This Encounter   Medication Reason    indapamide (LOZOL) 1.25 mg tablet REORDER    atorvastatin (LIPITOR) 40 mg tablet REORDER    raloxifene (EVISTA) 60 mg tablet REORDER    atenoloL (TENORMIN) 25 mg tablet REORDER     Follow-up and Dispositions    · Return in about 6 months (around 3/27/2022) for HTN follow up. Treatment risks/benefits/costs/interactions/alternatives discussed with patient. Advised patient to call back or return to office if symptoms worsen/change/persist. If patient cannot reach us or should anything more severe/urgent arise he/she should proceed directly to the nearest emergency department. Discussed expected course/resolution/complications of diagnosis in detail with patient. Patient expressed understanding with the diagnosis and plan. This dictation may have been completed with Dragon, the [employer] voice recognition software. Unanticipated grammatical, syntax, homophones, and other interpretive errors are sometimes inadvertently transcribed by the computer software. Please disregard any errors that have escaped final proofreading. Ganesh Guzman M.D.

## 2021-09-27 NOTE — PROGRESS NOTES
This is the Subsequent Medicare Annual Wellness Exam, performed 12 months or more after the Initial AWV or the last Subsequent AWV    I have reviewed the patient's medical history in detail and updated the computerized patient record. Assessment/Plan   Education and counseling provided:  Are appropriate based on today's review and evaluation    1. Encounter for Medicare annual wellness exam  2. Pure hypercholesterolemia  -     atorvastatin (LIPITOR) 20 mg tablet; Take 1 Tablet by mouth daily. Dose change, Normal, Disp-90 Tablet, R-2  3. Essential hypertension  -     indapamide (LOZOL) 2.5 mg tablet; Take 1 Tablet by mouth daily. Dose change, Normal, Disp-90 Tablet, R-2  -     atenoloL (TENORMIN) 25 mg tablet; TAKE 1 TABLET DAILY, Normal, Disp-90 Tablet, R-2  4. Osteopenia, unspecified location  -     raloxifene (EVISTA) 60 mg tablet; TAKE 1 TABLET DAILY, Normal, Disp-90 Tablet, R-2       Depression Risk Factor Screening     3 most recent PHQ Screens 9/27/2021   Little interest or pleasure in doing things Not at all   Feeling down, depressed, irritable, or hopeless Not at all   Total Score PHQ 2 0       Alcohol Risk Screen    Do you average more than 1 drink per night or more than 7 drinks a week:  No    On any one occasion in the past three months have you have had more than 3 drinks containing alcohol:  No        Functional Ability and Level of Safety    Hearing: Hearing is good. Activities of Daily Living: The home contains: no safety equipment.   ADL Assessment 9/27/2021   Feeding yourself No Help Needed   Getting from bed to chair No Help Needed   Getting dressed No Help Needed   Bathing or showering No Help Needed   Walk across the room (includes cane/walker) No Help Needed   Using the telphone No Help Needed   Taking your medications No Help Needed   Preparing meals No Help Needed   Managing money (expenses/bills) No Help Needed   Moderately strenuous housework (laundry) No Help Needed   Shopping for personal items (toiletries/medicines) No Help Needed   Shopping for groceries No Help Needed   Driving No Help Needed   Climbing a flight of stairs No Help Needed   Getting to places beyond walking distances No Help Needed         Ambulation: with no difficulty     Fall Risk:  Fall Risk Assessment, last 12 mths 9/27/2021   Able to walk? Yes   Fall in past 12 months? 0   Do you feel unsteady? 0   Are you worried about falling 0      Abuse Screen:  Patient is not abused       Cognitive Screening    Has your family/caregiver stated any concerns about your memory: no       Health Maintenance Due     Health Maintenance Due   Topic Date Due    Hepatitis C Screening  Never done    Shingrix Vaccine Age 50> (1 of 2) Never done    Medicare Yearly Exam  08/14/2021    Flu Vaccine (1) 09/01/2021       Patient Care Team   Patient Care Team:  Racheal Garcia MD as PCP - General (Family Medicine)  Racheal Garcia MD as PCP - [facility] Empaneled Provider  Bernard Rooney MD (Ophthalmology)  Dianne Dickerson MD (Gastroenterology)  Hussein Shafer MD (Dermatology)  Rambo Sebastian MD (Pulmonary Disease)  Evan Jordan MD (Colon and Rectal Surgery)    History     Patient Active Problem List   Diagnosis Code    Osteopenia M85.80    Allergic rhinitis J30.9    Vitamin D deficiency E55.9    Incidental lung nodule, greater than or equal to 8mm R91.1    Prediabetes R73.03    Hammer toe of right foot M20.41    BCC (basal cell carcinoma) C44.91    Tubular adenoma of colon D12.6    Shingles B02.9    Seasonal allergies J30.2    Screening for glaucoma Z13.5    Hypercholesterolemia E78.00    History of chicken pox Z86.19    Hiatal hernia K44.9    Dysphagia R13.10    Breast calcifications on mammogram R92.1    Advance directive in chart Z78.9    Abnormal CT scan R93.89     Past Medical History:   Diagnosis Date    Abnormal CT scan 3/2015 eval by Dr Td Whalen    ?  infectious process on CT 6/1/16 f/u note  Pulmo Asso note 6/2/17    Advance directive in chart 01/24/2012    BCC (basal cell carcinoma) 01/2009    basal  cell dr Estella Merlos q 6months 10/20/15    Breast calcifications on mammogram     Dysphagia     Hiatal hernia     History of chicken pox     Hypercholesterolemia     Osteopenia     7/14/15 dexa still shows osteopenia    Screening for glaucoma 12/15/2016    [facility] w/IOP    Seasonal allergies     Shingles     age 50's    Tubular adenoma of colon 2/3/12    dr Sloane Ruffin 5 yr repeat    Vitamin D deficiency       Past Surgical History:   Procedure Laterality Date    COLONOSCOPY  2/3/12    5 yr repeat    ENDOSCOPY, COLON, DIAGNOSTIC  12/01    10 yr repeat. Jeromyntier    HX COLONOSCOPY  04/27/2017    10 yr repeat [facility]    c section    HX ORTHOPAEDIC  1996    rotator cuff/rt shoulder reconstruction surgery    HX SKIN BIOPSY  1/2009    dr Estella Merlos- basal cell    [place] BREAST SURGERY PROCEDURE UNLISTED      history of several biopsies, college, and last one in 1990's     Current Outpatient Medications   Medication Sig Dispense Refill    atorvastatin (LIPITOR) 20 mg tablet Take 1 Tablet by mouth daily. Dose change 90 Tablet 2    indapamide (LOZOL) 2.5 mg tablet Take 1 Tablet by mouth daily. Dose change 90 Tablet 2    raloxifene (EVISTA) 60 mg tablet TAKE 1 TABLET DAILY 90 Tablet 2    atenoloL (TENORMIN) 25 mg tablet TAKE 1 TABLET DAILY 90 Tablet 2    Biotin 2,500 mcg cap Take 5,000 mcg by mouth daily.  fexofenadine (ALLEGRA) 180 mg tablet Take 180 mg by mouth. Take half tablet as needed      Cholecalciferol, Vitamin D3, (VITAMIN D) 2,000 unit Cap Take 2,000 Units by mouth two (2) times a day.  aspirin delayed-release 81 mg tablet Take 81 mg by mouth daily. m-w-f       MULTIVITAMINS (MULTIPLE VITAMIN PO) Take  by mouth daily.        Allergies   Allergen Reactions    Sulfa (Sulfonamide Antibiotics) Rash and Itching    Actonel [Risedronate] Other (comments) Gi upset    Erythromycin Other (comments)     GI upset    Pcn [Penicillins] Other (comments)     Pain in her hands       Family History   Problem Relation Age of Onset    Heart Disease Mother     Heart Disease Father     Cancer Father         bladder    Cancer Brother         espohagus    Stroke Maternal Grandfather      Social History     Tobacco Use    Smoking status: Never Smoker    Smokeless tobacco: Never Used   Substance Use Topics    Alcohol use:  Yes     Alcohol/week: 5.8 standard drinks     Types: 7 Glasses of wine per week         Mary Patterson MD

## 2022-01-21 ENCOUNTER — TELEPHONE (OUTPATIENT)
Dept: FAMILY MEDICINE CLINIC | Age: 77
End: 2022-01-21

## 2022-01-21 DIAGNOSIS — M85.80 OSTEOPENIA, UNSPECIFIED LOCATION: ICD-10-CM

## 2022-01-21 DIAGNOSIS — I10 ESSENTIAL HYPERTENSION: ICD-10-CM

## 2022-01-21 DIAGNOSIS — E78.00 PURE HYPERCHOLESTEROLEMIA: ICD-10-CM

## 2022-01-21 RX ORDER — ATORVASTATIN CALCIUM 20 MG/1
20 TABLET, FILM COATED ORAL DAILY
Qty: 90 TABLET | Refills: 0 | Status: SHIPPED | OUTPATIENT
Start: 2022-01-21 | End: 2022-10-03 | Stop reason: SDUPTHER

## 2022-01-21 RX ORDER — ATENOLOL 25 MG/1
TABLET ORAL
Qty: 90 TABLET | Refills: 0 | Status: SHIPPED | OUTPATIENT
Start: 2022-01-21 | End: 2022-05-02 | Stop reason: SDUPTHER

## 2022-01-21 RX ORDER — RALOXIFENE HYDROCHLORIDE 60 MG/1
TABLET, FILM COATED ORAL
Qty: 90 TABLET | Refills: 0 | Status: SHIPPED | OUTPATIENT
Start: 2022-01-21 | End: 2022-05-02 | Stop reason: SDUPTHER

## 2022-01-21 RX ORDER — INDAPAMIDE 2.5 MG/1
2.5 TABLET, FILM COATED ORAL DAILY
Qty: 90 TABLET | Refills: 0 | Status: SHIPPED | OUTPATIENT
Start: 2022-01-21 | End: 2022-05-02 | Stop reason: SDUPTHER

## 2022-01-21 NOTE — TELEPHONE ENCOUNTER
MD Jack Francoiso patient and she stated she has changed to a different plan therefore they do not have the rx's on file, even though same [facility]. (Confirmed with [facility], they need new rx's.)    **Relafen will be sent separately to [facility]**    Last Visit: 9/27/21 MD Manpreet Cortes  Next Appointment: 4/4/22 MD Souza  Previous Refill Encounter(s): 9/27/21 90 + 2 (all 3) Change in plan- needs new rx's    Requested Prescriptions     Pending Prescriptions Disp Refills    atenoloL (TENORMIN) 25 mg tablet 90 Tablet 1     Sig: TAKE 1 TABLET DAILY    indapamide (LOZOL) 2.5 mg tablet 90 Tablet 1     Sig: Take 1 Tablet by mouth daily. Dose change    atorvastatin (LIPITOR) 20 mg tablet 90 Tablet 1     Sig: Take 1 Tablet by mouth daily.  Dose change

## 2022-01-21 NOTE — TELEPHONE ENCOUNTER
MD Gregory Mortimer,    This to be sent to [facility]. Not available from her mailorder.   Thanks,     Last Visit: 9/27/21 MD Gregory Mortimer  Next Appointment: 4/4/22 MD Souza  Previous Refill Encounter(s): 9/27/21 90 + 2 ([facility])    Requested Prescriptions     Pending Prescriptions Disp Refills    raloxifene (EVISTA) 60 mg tablet 90 Tablet 1     Sig: TAKE 1 TABLET DAILY

## 2022-03-19 PROBLEM — M20.41 HAMMER TOE OF RIGHT FOOT: Status: ACTIVE | Noted: 2019-07-29

## 2022-04-08 ENCOUNTER — OFFICE VISIT (OUTPATIENT)
Dept: FAMILY MEDICINE CLINIC | Age: 77
End: 2022-04-08
Payer: MEDICARE

## 2022-04-08 VITALS
OXYGEN SATURATION: 95 % | TEMPERATURE: 98.6 F | DIASTOLIC BLOOD PRESSURE: 72 MMHG | RESPIRATION RATE: 16 BRPM | HEIGHT: 64 IN | SYSTOLIC BLOOD PRESSURE: 126 MMHG | WEIGHT: 198.4 LBS | HEART RATE: 78 BPM | BODY MASS INDEX: 33.87 KG/M2

## 2022-04-08 DIAGNOSIS — E78.5 HYPERLIPIDEMIA, UNSPECIFIED HYPERLIPIDEMIA TYPE: ICD-10-CM

## 2022-04-08 DIAGNOSIS — I10 HTN, GOAL BELOW 150/90: Primary | ICD-10-CM

## 2022-04-08 DIAGNOSIS — Z11.59 NEED FOR HEPATITIS C SCREENING TEST: ICD-10-CM

## 2022-04-08 PROCEDURE — G8752 SYS BP LESS 140: HCPCS | Performed by: FAMILY MEDICINE

## 2022-04-08 PROCEDURE — G8399 PT W/DXA RESULTS DOCUMENT: HCPCS | Performed by: FAMILY MEDICINE

## 2022-04-08 PROCEDURE — 1101F PT FALLS ASSESS-DOCD LE1/YR: CPT | Performed by: FAMILY MEDICINE

## 2022-04-08 PROCEDURE — G8536 NO DOC ELDER MAL SCRN: HCPCS | Performed by: FAMILY MEDICINE

## 2022-04-08 PROCEDURE — G8417 CALC BMI ABV UP PARAM F/U: HCPCS | Performed by: FAMILY MEDICINE

## 2022-04-08 PROCEDURE — G8754 DIAS BP LESS 90: HCPCS | Performed by: FAMILY MEDICINE

## 2022-04-08 PROCEDURE — G0463 HOSPITAL OUTPT CLINIC VISIT: HCPCS | Performed by: FAMILY MEDICINE

## 2022-04-08 PROCEDURE — G8427 DOCREV CUR MEDS BY ELIG CLIN: HCPCS | Performed by: FAMILY MEDICINE

## 2022-04-08 PROCEDURE — G8510 SCR DEP NEG, NO PLAN REQD: HCPCS | Performed by: FAMILY MEDICINE

## 2022-04-08 PROCEDURE — 99214 OFFICE O/P EST MOD 30 MIN: CPT | Performed by: FAMILY MEDICINE

## 2022-04-08 PROCEDURE — 1090F PRES/ABSN URINE INCON ASSESS: CPT | Performed by: FAMILY MEDICINE

## 2022-04-08 NOTE — PROGRESS NOTES
Patient Name: Sara Park   MRN: [id number]    Bertha Enriquez is a 76 y.o. female who presents with the following:     Had the first Shingrix shot in Feb.  Decreased statin since last visit. Has been eating a little healthier. BP Readings from Last 3 Encounters:   04/08/22 126/72   09/27/21 (!) 152/85   07/29/19 138/80     Wt Readings from Last 3 Encounters:   04/08/22 198 lb 6.4 oz (90 kg)   09/27/21 205 lb 6.4 oz (93.2 kg)   07/29/19 202 lb 9.6 oz (91.9 kg)     Review of Systems   Constitutional: Negative for fever, malaise/fatigue and weight loss. Respiratory: Negative for cough, hemoptysis, shortness of breath and wheezing. Cardiovascular: Negative for chest pain, palpitations, leg swelling and PND. Gastrointestinal: Negative for abdominal pain, constipation, diarrhea, nausea and vomiting. The patient's medications, allergies, past medical history, surgical history, family history and social history were reviewed and updated where appropriate. Current Outpatient Medications:     atenoloL (TENORMIN) 25 mg tablet, TAKE 1 TABLET DAILY, Disp: 90 Tablet, Rfl: 0    indapamide (LOZOL) 2.5 mg tablet, Take 1 Tablet by mouth daily. Dose change, Disp: 90 Tablet, Rfl: 0    atorvastatin (LIPITOR) 20 mg tablet, Take 1 Tablet by mouth daily. Dose change, Disp: 90 Tablet, Rfl: 0    raloxifene (EVISTA) 60 mg tablet, TAKE 1 TABLET DAILY, Disp: 90 Tablet, Rfl: 0    Biotin 2,500 mcg cap, Take 5,000 mcg by mouth daily. , Disp: , Rfl:     fexofenadine (ALLEGRA) 180 mg tablet, Take 180 mg by mouth. Take half tablet as needed, Disp: , Rfl:     Cholecalciferol, Vitamin D3, (VITAMIN D) 2,000 unit Cap, Take 2,000 Units by mouth two (2) times a day., Disp: , Rfl:     aspirin delayed-release 81 mg tablet, Take 81 mg by mouth daily. m-w-f , Disp: , Rfl:     MULTIVITAMINS (MULTIPLE VITAMIN PO), Take  by mouth daily. , Disp: , Rfl:     Allergies   Allergen Reactions    Sulfa (Sulfonamide Antibiotics) Rash and Itching    Actonel [Risedronate] Other (comments)     Gi upset    Erythromycin Other (comments)     GI upset    Pcn [Penicillins] Other (comments)     Pain in her hands         OBJECTIVE    Visit Vitals  /72 (BP 1 Location: Left lower arm, BP Patient Position: Sitting, BP Cuff Size: Adult)   Pulse 78   Temp 98.6 °F (37 °C) (Temporal)   Resp 16   Ht 5' 4\" (1.626 m)   Wt 198 lb 6.4 oz (90 kg)   LMP  (LMP Unknown)   SpO2 95%   BMI 34.06 kg/m²       Physical Exam  Vitals and nursing note reviewed. Constitutional:       General: She is not in acute distress. Appearance: She is not diaphoretic. Eyes:      Conjunctiva/sclera: Conjunctivae normal.      Pupils: Pupils are equal, round, and reactive to light. Cardiovascular:      Rate and Rhythm: Normal rate and regular rhythm. Heart sounds: Normal heart sounds. No murmur heard. No friction rub. No gallop. Pulmonary:      Effort: Pulmonary effort is normal. No respiratory distress. Breath sounds: Normal breath sounds. No wheezing. Skin:     General: Skin is warm and dry. Neurological:      Mental Status: She is alert. ASSESSMENT AND PLAN  Ning Wilhelm is a 76 y.o. female who presents today for:    1. HTN, goal below 150/90  Stable, continue current treatment. 2. Hyperlipidemia, unspecified hyperlipidemia type  Will calculate ASCVD risk score pending labs. - METABOLIC PANEL, COMPREHENSIVE; Future  - LIPID PANEL; Future  - LIPID PANEL  - METABOLIC PANEL, COMPREHENSIVE    3. Need for hepatitis C screening test  - HEPATITIS C AB; Future  - HEPATITIS C AB       There are no discontinued medications. Follow-up and Dispositions    · Return in about 5 months (around 9/8/2022) for Medicare Wellness Visit (30 min). Treatment risks/benefits/costs/interactions/alternatives discussed with patient.   Advised patient to call back or return to office if symptoms worsen/change/persist. If patient cannot reach us or should anything more severe/urgent arise he/she should proceed directly to the nearest emergency department. Discussed expected course/resolution/complications of diagnosis in detail with patient. Patient expressed understanding with the diagnosis and plan. This dictation may have been completed with Dragon, the computer voice recognition software. Unanticipated grammatical, syntax, homophones, and other interpretive errors are sometimes inadvertently transcribed by the computer software. Please disregard any errors that have escaped final proofreading. Ganesh Mcneil M.D.

## 2022-04-08 NOTE — PROGRESS NOTES
Chief Complaint   Patient presents with    Hypertension       1. \"Have you been to the ER, urgent care clinic since your last visit? Hospitalized since your last visit? \" No    2. \"Have you seen or consulted any other health care providers outside of the [address] [place] [address] since your last visit? \" Yes When: 04/22 Where: [facility] Reason for visit: mammogram     3. For patients aged 39-70: Has the patient had a colonoscopy / FIT/ Cologuard? NA - based on age      If the patient is female:    4. For patients aged 41-77: Has the patient had a mammogram within the past 2 years? NA - based on age or sex      11. For patients aged 21-65: Has the patient had a pap smear?  NA - based on age or sex    3 most recent PHQ Screens 4/8/2022   Little interest or pleasure in doing things Not at all   Feeling down, depressed, irritable, or hopeless Not at all   Total Score PHQ 2 0

## 2022-04-09 LAB
ALBUMIN SERPL-MCNC: 3.6 G/DL (ref 3.5–5)
ALBUMIN/GLOB SERPL: 1.2 {RATIO} (ref 1.1–2.2)
ALP SERPL-CCNC: 56 U/L (ref 45–117)
ALT SERPL-CCNC: 36 U/L (ref 12–78)
ANION GAP SERPL CALC-SCNC: 3 MMOL/L (ref 5–15)
AST SERPL-CCNC: 25 U/L (ref 15–37)
BILIRUB SERPL-MCNC: 0.4 MG/DL (ref 0.2–1)
BUN SERPL-MCNC: 17 MG/DL (ref 6–20)
BUN/CREAT SERPL: 25 (ref 12–20)
CALCIUM SERPL-MCNC: 9.1 MG/DL (ref 8.5–10.1)
CHLORIDE SERPL-SCNC: 103 MMOL/L (ref 97–108)
CHOLEST SERPL-MCNC: 174 MG/DL
CO2 SERPL-SCNC: 33 MMOL/L (ref 21–32)
CREAT SERPL-MCNC: 0.68 MG/DL (ref 0.55–1.02)
GLOBULIN SER CALC-MCNC: 3.1 G/DL (ref 2–4)
GLUCOSE SERPL-MCNC: 105 MG/DL (ref 65–100)
HCV AB SERPL QL IA: NONREACTIVE
HDLC SERPL-MCNC: 63 MG/DL
HDLC SERPL: 2.8 {RATIO} (ref 0–5)
LDLC SERPL CALC-MCNC: 79.6 MG/DL (ref 0–100)
POTASSIUM SERPL-SCNC: 3.8 MMOL/L (ref 3.5–5.1)
PROT SERPL-MCNC: 6.7 G/DL (ref 6.4–8.2)
SODIUM SERPL-SCNC: 139 MMOL/L (ref 136–145)
TRIGL SERPL-MCNC: 157 MG/DL (ref ?–150)
VLDLC SERPL CALC-MCNC: 31.4 MG/DL

## 2022-04-12 NOTE — PROGRESS NOTES
Dear Ms. Tripp,    I wanted to follow up on your recent test results: Your cholesterol looks like at goal on the current statin dose. You do not have hepatitis C. Continue your current medications.

## 2022-04-30 DIAGNOSIS — I10 ESSENTIAL HYPERTENSION: ICD-10-CM

## 2022-04-30 RX ORDER — ATENOLOL 25 MG/1
TABLET ORAL
Qty: 90 TABLET | Refills: 0 | OUTPATIENT
Start: 2022-04-30

## 2022-04-30 RX ORDER — INDAPAMIDE 2.5 MG/1
TABLET, FILM COATED ORAL
Qty: 90 TABLET | Refills: 0 | OUTPATIENT
Start: 2022-04-30

## 2022-05-02 DIAGNOSIS — M85.80 OSTEOPENIA, UNSPECIFIED LOCATION: ICD-10-CM

## 2022-05-02 RX ORDER — ATENOLOL 25 MG/1
TABLET ORAL
Qty: 90 TABLET | Refills: 3 | Status: SHIPPED | OUTPATIENT
Start: 2022-05-02

## 2022-05-02 RX ORDER — INDAPAMIDE 2.5 MG/1
2.5 TABLET, FILM COATED ORAL DAILY
Qty: 90 TABLET | Refills: 3 | Status: SHIPPED | OUTPATIENT
Start: 2022-05-02

## 2022-05-02 RX ORDER — RALOXIFENE HYDROCHLORIDE 60 MG/1
60 TABLET, FILM COATED ORAL DAILY
Qty: 90 TABLET | Refills: 3 | Status: SHIPPED | OUTPATIENT
Start: 2022-05-02

## 2022-05-02 NOTE — TELEPHONE ENCOUNTER
Last Visit: 4/8/22 MD Bianka Blancas  Next Appointment: 10/3/22 MD Souza  Previous Refill Encounter(s): 1/21/22 90    Requested Prescriptions     Pending Prescriptions Disp Refills    raloxifene (EVISTA) 60 mg tablet 90 Tablet 1     Sig: Take 1 Tablet by mouth daily.

## 2022-09-26 ENCOUNTER — TELEPHONE (OUTPATIENT)
Dept: FAMILY MEDICINE CLINIC | Age: 77
End: 2022-09-26

## 2022-09-26 DIAGNOSIS — R73.03 PREDIABETES: ICD-10-CM

## 2022-09-26 DIAGNOSIS — E78.5 HYPERLIPIDEMIA, UNSPECIFIED HYPERLIPIDEMIA TYPE: Primary | ICD-10-CM

## 2022-09-26 DIAGNOSIS — E55.9 VITAMIN D DEFICIENCY: ICD-10-CM

## 2022-09-26 NOTE — TELEPHONE ENCOUNTER
Patient called wants to know if labs could be put in has lab appointment for 09/27/2022 at 9:30 a.m tomorrow.     Best call back #[phone]

## 2022-09-27 ENCOUNTER — APPOINTMENT (OUTPATIENT)
Dept: FAMILY MEDICINE CLINIC | Age: 77
End: 2022-09-27

## 2022-09-27 DIAGNOSIS — R73.03 PREDIABETES: ICD-10-CM

## 2022-09-27 DIAGNOSIS — E55.9 VITAMIN D DEFICIENCY: ICD-10-CM

## 2022-09-27 DIAGNOSIS — E78.5 HYPERLIPIDEMIA, UNSPECIFIED HYPERLIPIDEMIA TYPE: ICD-10-CM

## 2022-09-28 LAB
25(OH)D3 SERPL-MCNC: 40.5 NG/ML (ref 30–100)
ALBUMIN SERPL-MCNC: 3.7 G/DL (ref 3.5–5)
ALBUMIN/GLOB SERPL: 1.3 {RATIO} (ref 1.1–2.2)
ALP SERPL-CCNC: 54 U/L (ref 45–117)
ALT SERPL-CCNC: 34 U/L (ref 12–78)
ANION GAP SERPL CALC-SCNC: 2 MMOL/L (ref 5–15)
AST SERPL-CCNC: 29 U/L (ref 15–37)
BILIRUB SERPL-MCNC: 0.5 MG/DL (ref 0.2–1)
BUN SERPL-MCNC: 12 MG/DL (ref 6–20)
BUN/CREAT SERPL: 19 (ref 12–20)
CALCIUM SERPL-MCNC: 9.4 MG/DL (ref 8.5–10.1)
CHLORIDE SERPL-SCNC: 104 MMOL/L (ref 97–108)
CHOLEST SERPL-MCNC: 182 MG/DL
CO2 SERPL-SCNC: 34 MMOL/L (ref 21–32)
CREAT SERPL-MCNC: 0.63 MG/DL (ref 0.55–1.02)
ERYTHROCYTE [DISTWIDTH] IN BLOOD BY AUTOMATED COUNT: 15.8 % (ref 11.5–14.5)
EST. AVERAGE GLUCOSE BLD GHB EST-MCNC: 128 MG/DL
GLOBULIN SER CALC-MCNC: 2.9 G/DL (ref 2–4)
GLUCOSE SERPL-MCNC: 102 MG/DL (ref 65–100)
HBA1C MFR BLD: 6.1 % (ref 4–5.6)
HCT VFR BLD AUTO: 43.9 % (ref 35–47)
HDLC SERPL-MCNC: 73 MG/DL
HDLC SERPL: 2.5 {RATIO} (ref 0–5)
HGB BLD-MCNC: 14.3 G/DL (ref 11.5–16)
LDLC SERPL CALC-MCNC: 84.4 MG/DL (ref 0–100)
MCH RBC QN AUTO: 31.2 PG (ref 26–34)
MCHC RBC AUTO-ENTMCNC: 32.6 G/DL (ref 30–36.5)
MCV RBC AUTO: 95.6 FL (ref 80–99)
NRBC # BLD: 0 K/UL (ref 0–0.01)
NRBC BLD-RTO: 0 PER 100 WBC
PLATELET # BLD AUTO: 225 K/UL (ref 150–400)
PMV BLD AUTO: 10.5 FL (ref 8.9–12.9)
POTASSIUM SERPL-SCNC: 3.7 MMOL/L (ref 3.5–5.1)
PROT SERPL-MCNC: 6.6 G/DL (ref 6.4–8.2)
RBC # BLD AUTO: 4.59 M/UL (ref 3.8–5.2)
SODIUM SERPL-SCNC: 140 MMOL/L (ref 136–145)
TRIGL SERPL-MCNC: 123 MG/DL (ref ?–150)
VLDLC SERPL CALC-MCNC: 24.6 MG/DL
WBC # BLD AUTO: 6.2 K/UL (ref 3.6–11)

## 2022-10-03 ENCOUNTER — OFFICE VISIT (OUTPATIENT)
Dept: FAMILY MEDICINE CLINIC | Age: 77
End: 2022-10-03
Payer: MEDICARE

## 2022-10-03 VITALS
SYSTOLIC BLOOD PRESSURE: 124 MMHG | HEIGHT: 64 IN | HEART RATE: 78 BPM | DIASTOLIC BLOOD PRESSURE: 74 MMHG | TEMPERATURE: 98 F | WEIGHT: 197.8 LBS | RESPIRATION RATE: 16 BRPM | BODY MASS INDEX: 33.77 KG/M2 | OXYGEN SATURATION: 97 %

## 2022-10-03 DIAGNOSIS — Z78.0 POSTMENOPAUSAL: ICD-10-CM

## 2022-10-03 DIAGNOSIS — Z23 NEEDS FLU SHOT: ICD-10-CM

## 2022-10-03 DIAGNOSIS — Z13.820 SCREENING FOR OSTEOPOROSIS: ICD-10-CM

## 2022-10-03 DIAGNOSIS — N39.0 URINARY TRACT INFECTION WITHOUT HEMATURIA, SITE UNSPECIFIED: ICD-10-CM

## 2022-10-03 DIAGNOSIS — Z00.00 ENCOUNTER FOR MEDICARE ANNUAL WELLNESS EXAM: Primary | ICD-10-CM

## 2022-10-03 DIAGNOSIS — E78.00 PURE HYPERCHOLESTEROLEMIA: ICD-10-CM

## 2022-10-03 PROCEDURE — 1123F ACP DISCUSS/DSCN MKR DOCD: CPT | Performed by: FAMILY MEDICINE

## 2022-10-03 PROCEDURE — 1090F PRES/ABSN URINE INCON ASSESS: CPT | Performed by: FAMILY MEDICINE

## 2022-10-03 PROCEDURE — G8399 PT W/DXA RESULTS DOCUMENT: HCPCS | Performed by: FAMILY MEDICINE

## 2022-10-03 PROCEDURE — 99213 OFFICE O/P EST LOW 20 MIN: CPT | Performed by: FAMILY MEDICINE

## 2022-10-03 PROCEDURE — G8752 SYS BP LESS 140: HCPCS | Performed by: FAMILY MEDICINE

## 2022-10-03 PROCEDURE — G8427 DOCREV CUR MEDS BY ELIG CLIN: HCPCS | Performed by: FAMILY MEDICINE

## 2022-10-03 PROCEDURE — G8536 NO DOC ELDER MAL SCRN: HCPCS | Performed by: FAMILY MEDICINE

## 2022-10-03 PROCEDURE — 90694 VACC AIIV4 NO PRSRV 0.5ML IM: CPT | Performed by: FAMILY MEDICINE

## 2022-10-03 PROCEDURE — G0439 PPPS, SUBSEQ VISIT: HCPCS | Performed by: FAMILY MEDICINE

## 2022-10-03 PROCEDURE — G8754 DIAS BP LESS 90: HCPCS | Performed by: FAMILY MEDICINE

## 2022-10-03 PROCEDURE — G8417 CALC BMI ABV UP PARAM F/U: HCPCS | Performed by: FAMILY MEDICINE

## 2022-10-03 PROCEDURE — 1101F PT FALLS ASSESS-DOCD LE1/YR: CPT | Performed by: FAMILY MEDICINE

## 2022-10-03 PROCEDURE — G0463 HOSPITAL OUTPT CLINIC VISIT: HCPCS | Performed by: FAMILY MEDICINE

## 2022-10-03 PROCEDURE — G8510 SCR DEP NEG, NO PLAN REQD: HCPCS | Performed by: FAMILY MEDICINE

## 2022-10-03 RX ORDER — ATORVASTATIN CALCIUM 10 MG/1
10 TABLET, FILM COATED ORAL DAILY
Qty: 90 TABLET | Refills: 3 | Status: SHIPPED | OUTPATIENT
Start: 2022-10-03

## 2022-10-03 NOTE — PROGRESS NOTES
This is the Subsequent Medicare Annual Wellness Exam, performed 12 months or more after the Initial AWV or the last Subsequent AWV    I have reviewed the patient's medical history in detail and updated the computerized patient record. Assessment/Plan   Education and counseling provided:  Are appropriate based on today's review and evaluation    1. Encounter for Medicare annual wellness exam  2. Pure hypercholesterolemia  -     atorvastatin (LIPITOR) 10 mg tablet; Take 1 Tablet by mouth daily. Dose change, Normal, Disp-90 Tablet, R-3  -     LIPID PANEL; Future  -     METABOLIC PANEL, COMPREHENSIVE; Future  3. Screening for osteoporosis  -     DEXA BONE DENSITY STUDY AXIAL; Future  4. Postmenopausal  -     DEXA BONE DENSITY STUDY AXIAL; Future  5. Urinary tract infection without hematuria, site unspecified  6. Needs flu shot  -     INFLUENZA, FLUAD, (AGE 72 Y+), IM, PF, 0.5 ML       Depression Risk Factor Screening     3 most recent PHQ Screens 10/3/2022   Little interest or pleasure in doing things Not at all   Feeling down, depressed, irritable, or hopeless Not at all   Total Score PHQ 2 0       Alcohol & Drug Abuse Risk Screen    Do you average more than 1 drink per night or more than 7 drinks a week:  No    On any one occasion in the past three months have you have had more than 3 drinks containing alcohol:  No          Functional Ability and Level of Safety    Hearing: Hearing is good. Activities of Daily Living: The home contains: no safety equipment.   ADL Assessment 10/3/2022   Feeding yourself No Help Needed   Getting from bed to chair No Help Needed   Getting dressed No Help Needed   Bathing or showering No Help Needed   Walk across the room (includes cane/walker) No Help Needed   Using the telphone No Help Needed   Taking your medications No Help Needed   Preparing meals No Help Needed   Managing money (expenses/bills) No Help Needed   Moderately strenuous housework (laundry) No Help Needed Shopping for personal items (toiletries/medicines) No Help Needed   Shopping for groceries No Help Needed   Driving No Help Needed   Climbing a flight of stairs No Help Needed   Getting to places beyond walking distances No Help Needed       Ambulation: with no difficulty     Fall Risk:  Fall Risk Assessment, last 12 mths 10/3/2022   Able to walk? Yes   Fall in past 12 months? 0   Do you feel unsteady? 0   Are you worried about falling 0      Abuse Screen:  Patient is not abused       Cognitive Screening    Has your family/caregiver stated any concerns about your memory: no       Health Maintenance Due     Health Maintenance Due   Topic Date Due    Shingrix Vaccine Age 49> (2 of 2) 08/20/2022       Patient Care Team   Patient Care Team:  Luis Mohr MD as PCP - General (Family Medicine)  Luis Mohr MD as PCP - [address] [place] [address]  [facility] Provider  Selene Lopez MD (Ophthalmology)  Kailee Galeana MD (Gastroenterology)  Michael Ng MD (Dermatology Physician)  Saul Shields MD (Pulmonary Disease)  Chely Rowland MD (Colon and Rectal Surgery)    History     Patient Active Problem List   Diagnosis Code    Osteopenia M85.80    Allergic rhinitis J30.9    Vitamin D deficiency E55.9    Incidental lung nodule, greater than or equal to 8mm R91.1    Prediabetes R73.03    Hammer toe of right foot M20.41    BCC (basal cell carcinoma) C44.91    Tubular adenoma of colon D12.6    Shingles B02.9    Seasonal allergies J30.2    Screening for glaucoma Z13.5    Hypercholesterolemia E78.00    History of chicken pox Z86.19    Hiatal hernia K44.9    Dysphagia R13.10    Breast calcifications on mammogram R92.1    Advance directive in chart Z78.9    Abnormal CT scan R93.89     Past Medical History:   Diagnosis Date    Abnormal CT scan 3/2015 eval by Dr Shan Crews    ?  infectious process on CT 6/1/16 f/u note  Pulmo Asso note 6/2/17    Advance directive in chart 01/24/2012    BCC (basal cell carcinoma) 01/2009    basal  cell dr Rajan Thayer q 6months 10/20/15    Breast calcifications on mammogram     Dysphagia     Hiatal hernia     History of chicken pox     Hypercholesterolemia     Osteopenia     7/14/15 dexa still shows osteopenia    Screening for glaucoma 12/15/2016    [facility] w/IOP    Seasonal allergies     Shingles     age 50's    Tubular adenoma of colon 2/3/12    dr Flory Rockwell 5 yr repeat    Vitamin D deficiency       Past Surgical History:   Procedure Laterality Date    COLONOSCOPY  2/3/12    5 yr repeat    ENDOSCOPY, COLON, DIAGNOSTIC  12/01    10 yr repeat. [facility]    HX COLONOSCOPY  04/27/2017    10 yr repeat [address] section    HX ORTHOPAEDIC  1996    rotator cuff/rt shoulder reconstruction surgery    HX SKIN BIOPSY  1/2009    dr Rajan Thayer- basal cell    [place] BREAST SURGERY PROCEDURE UNLISTED      history of several biopsies, college, and last one in 1990's     Current Outpatient Medications   Medication Sig Dispense Refill    atorvastatin (LIPITOR) 10 mg tablet Take 1 Tablet by mouth daily. Dose change 90 Tablet 3    atenoloL (TENORMIN) 25 mg tablet TAKE 1 TABLET DAILY 90 Tablet 3    indapamide (LOZOL) 2.5 mg tablet Take 1 Tablet by mouth daily. 90 Tablet 3    raloxifene (EVISTA) 60 mg tablet Take 1 Tablet by mouth daily. 90 Tablet 3    Biotin 2,500 mcg cap Take 5,000 mcg by mouth daily. fexofenadine (ALLEGRA) 180 mg tablet Take 180 mg by mouth. Take half tablet as needed      cholecalciferol (VITAMIN D3) (2,000 UNITS /50 MCG) cap capsule Take 2,000 Units by mouth two (2) times a day. aspirin delayed-release 81 mg tablet Take 81 mg by mouth daily. m-w-f       MULTIVITAMINS (MULTIPLE VITAMIN PO) Take  by mouth daily.        Allergies   Allergen Reactions    Sulfa (Sulfonamide Antibiotics) Rash and Itching    Actonel [Risedronate] Other (comments)     Gi upset    Erythromycin Other (comments)     GI upset    Pcn [Penicillins] Other (comments)     Pain in her hands       Family History Problem Relation Age of Onset    Heart Disease Mother     Heart Disease Father     Cancer Father         bladder    Cancer Brother         espohagus    Stroke Maternal Grandfather      Social History     Tobacco Use    Smoking status: Never    Smokeless tobacco: Never   Substance Use Topics    Alcohol use:  Yes     Alcohol/week: 5.8 standard drinks     Types: 7 Glasses of wine per week         Kasey Rivero MD

## 2022-10-03 NOTE — PROGRESS NOTES
Chief Complaint   Patient presents with    Annual Wellness Visit         1. Have you been to the ER, urgent care clinic since your last visit? Hospitalized since your last visit? Yes When: 10/22 [facility] Reason for visit: UTI    2. Have you seen or consulted any other health care providers outside of the [address] [place] [address] [place] since your last visit? Include any pap smears or colon screening. No    3. For patients over 45: Has the patient had a colonoscopy? Yes - no Care Gap present     If the patient is female:    4. For patients over 40: Has the patient had a mammogram? Yes - no Care Gap present    5. For patients over 21: Has the patient had a pap smear? Yes - no Care Gap present    3 most recent PHQ Screens 10/3/2022   Little interest or pleasure in doing things Not at all   Feeling down, depressed, irritable, or hopeless Not at all   Total Score PHQ 2 0       Fall Risk Assessment, last 12 mths 10/3/2022   Able to walk? Yes   Fall in past 12 months? 0   Do you feel unsteady? 0   Are you worried about falling 0       ADL Assessment 10/3/2022   Feeding yourself No Help Needed   Getting from bed to chair No Help Needed   Getting dressed No Help Needed   Bathing or showering No Help Needed   Walk across the room (includes cane/walker) No Help Needed   Using the telphone No Help Needed   Taking your medications No Help Needed   Preparing meals No Help Needed   Managing money (expenses/bills) No Help Needed   Moderately strenuous housework (laundry) No Help Needed   Shopping for personal items (toiletries/medicines) No Help Needed   Shopping for groceries No Help Needed   Driving No Help Needed   Climbing a flight of stairs No Help Needed   Getting to places beyond walking distances No Help Needed       Abuse Screening Questionnaire 10/3/2022   Do you ever feel afraid of your partner? N   Are you in a relationship with someone who physically or mentally threatens you? N   Is it safe for you to go home? Violeta Room

## 2022-10-03 NOTE — PROGRESS NOTES
Patient Name: Phoenix Lerner   MRN: [id number]    Latasha Knight is a 76 y.o. female who presents with the following:     Was seen at urgent care last Friday for burning with urination and blood in urine. She was given 10 days of ciprofloxacin. Currently states she is feeling back to normal without any fevers or back pain (did not have fevers or back pain to start with). Is waiting for urine culture to finalize. Due for bone density test this December. Needs an updated order. Would like to lower statin dose if possible. Lab Results   Component Value Date/Time    Cholesterol, total 182 09/27/2022 10:24 AM    HDL Cholesterol 73 09/27/2022 10:24 AM    LDL, calculated 84.4 09/27/2022 10:24 AM    VLDL, calculated 24.6 09/27/2022 10:24 AM    Triglyceride 123 09/27/2022 10:24 AM    CHOL/HDL Ratio 2.5 09/27/2022 10:24 AM     BP Readings from Last 3 Encounters:   10/03/22 124/74   04/08/22 126/72   09/27/21 (!) 152/85     Wt Readings from Last 3 Encounters:   10/03/22 197 lb 12.8 oz (89.7 kg)   04/08/22 198 lb 6.4 oz (90 kg)   09/27/21 205 lb 6.4 oz (93.2 kg)     Review of Systems   Constitutional:  Negative for fever, malaise/fatigue and weight loss. Respiratory:  Negative for cough, hemoptysis, shortness of breath and wheezing. Cardiovascular:  Negative for chest pain, palpitations, leg swelling and PND. Gastrointestinal:  Negative for abdominal pain, constipation, diarrhea, nausea and vomiting. The patient's medications, allergies, past medical history, surgical history, family history and social history were reviewed and updated where appropriate. Current Outpatient Medications:     atorvastatin (LIPITOR) 10 mg tablet, Take 1 Tablet by mouth daily. Dose change, Disp: 90 Tablet, Rfl: 3    atenoloL (TENORMIN) 25 mg tablet, TAKE 1 TABLET DAILY, Disp: 90 Tablet, Rfl: 3    indapamide (LOZOL) 2.5 mg tablet, Take 1 Tablet by mouth daily. , Disp: 90 Tablet, Rfl: 3    raloxifene (EVISTA) 60 mg tablet, Take 1 Tablet by mouth daily. , Disp: 90 Tablet, Rfl: 3    Biotin 2,500 mcg cap, Take 5,000 mcg by mouth daily. , Disp: , Rfl:     fexofenadine (ALLEGRA) 180 mg tablet, Take 180 mg by mouth. Take half tablet as needed, Disp: , Rfl:     cholecalciferol (VITAMIN D3) (2,000 UNITS /50 MCG) cap capsule, Take 2,000 Units by mouth two (2) times a day., Disp: , Rfl:     aspirin delayed-release 81 mg tablet, Take 81 mg by mouth daily. m-w-f , Disp: , Rfl:     MULTIVITAMINS (MULTIPLE VITAMIN PO), Take  by mouth daily. , Disp: , Rfl:     Allergies   Allergen Reactions    Sulfa (Sulfonamide Antibiotics) Rash and Itching    Actonel [Risedronate] Other (comments)     Gi upset    Erythromycin Other (comments)     GI upset    Pcn [Penicillins] Other (comments)     Pain in her hands       OBJECTIVE    Visit Vitals  /74   Pulse 78   Temp 98 °F (36.7 °C) (Temporal)   Resp 16   Ht 5' 4\" (1.626 m)   Wt 197 lb 12.8 oz (89.7 kg)   LMP  (LMP Unknown)   SpO2 97%   BMI 33.95 kg/m²       Physical Exam  Vitals and nursing note reviewed. Constitutional:       General: She is not in acute distress. Appearance: She is not diaphoretic. Eyes:      Conjunctiva/sclera: Conjunctivae normal.      Pupils: Pupils are equal, round, and reactive to light. Cardiovascular:      Rate and Rhythm: Normal rate and regular rhythm. Heart sounds: Normal heart sounds. No murmur heard. No friction rub. No gallop. Pulmonary:      Effort: Pulmonary effort is normal. No respiratory distress. Breath sounds: Normal breath sounds. No wheezing. Skin:     General: Skin is warm and dry. Neurological:      Mental Status: She is alert. ASSESSMENT AND PLAN  Aretha Boyd is a 76 y.o. female who presents today for:    1. Encounter for Medicare annual wellness exam    2. Pure hypercholesterolemia  Can reduce to 10 mg and repeat labs in 3 months. - atorvastatin (LIPITOR) 10 mg tablet; Take 1 Tablet by mouth daily.  Dose change Dispense: 90 Tablet; Refill: 3  - LIPID PANEL; Future  - METABOLIC PANEL, COMPREHENSIVE; Future    3. Screening for osteoporosis  - DEXA BONE DENSITY STUDY AXIAL; Future    4. Postmenopausal  - DEXA BONE DENSITY STUDY AXIAL; Future    5. Urinary tract infection without hematuria, site unspecified  Pt to follow up on final urine culture. If culture shows infection was sensitive to cipro and she is otherwise back at baseline, can stop after 3 total days of ciprofloxacin as it did not seem like she had signs of pyelonephritis. 6. Needs flu shot  - INFLUENZA, FLUAD, (AGE 72 Y+), IM, PF, 0.5 ML       Medications Discontinued During This Encounter   Medication Reason    atorvastatin (LIPITOR) 20 mg tablet REORDER           Treatment risks/benefits/costs/interactions/alternatives discussed with patient. Advised patient to call back or return to office if symptoms worsen/change/persist. If patient cannot reach us or should anything more severe/urgent arise he/she should proceed directly to the nearest emergency department. Discussed expected course/resolution/complications of diagnosis in detail with patient. Patient expressed understanding with the diagnosis and plan. This dictation may have been completed with Dragon, the Yes[employer] voice recognition software. Unanticipated grammatical, syntax, homophones, and other interpretive errors are sometimes inadvertently transcribed by the computer software. Please disregard any errors that have escaped final proofreading. Ganesh Stoner M.D.

## 2022-12-13 ENCOUNTER — APPOINTMENT (OUTPATIENT)
Dept: FAMILY MEDICINE CLINIC | Age: 77
End: 2022-12-13

## 2022-12-13 DIAGNOSIS — E78.00 PURE HYPERCHOLESTEROLEMIA: ICD-10-CM

## 2022-12-13 LAB
ALBUMIN SERPL-MCNC: 3.6 G/DL (ref 3.5–5)
ALBUMIN/GLOB SERPL: 1.2 {RATIO} (ref 1.1–2.2)
ALP SERPL-CCNC: 53 U/L (ref 45–117)
ALT SERPL-CCNC: 30 U/L (ref 12–78)
ANION GAP SERPL CALC-SCNC: 4 MMOL/L (ref 5–15)
AST SERPL-CCNC: 23 U/L (ref 15–37)
BILIRUB SERPL-MCNC: 0.5 MG/DL (ref 0.2–1)
BUN SERPL-MCNC: 14 MG/DL (ref 6–20)
BUN/CREAT SERPL: 21 (ref 12–20)
CALCIUM SERPL-MCNC: 9.2 MG/DL (ref 8.5–10.1)
CHLORIDE SERPL-SCNC: 104 MMOL/L (ref 97–108)
CHOLEST SERPL-MCNC: 182 MG/DL
CO2 SERPL-SCNC: 33 MMOL/L (ref 21–32)
CREAT SERPL-MCNC: 0.68 MG/DL (ref 0.55–1.02)
GLOBULIN SER CALC-MCNC: 2.9 G/DL (ref 2–4)
GLUCOSE SERPL-MCNC: 109 MG/DL (ref 65–100)
HDLC SERPL-MCNC: 69 MG/DL
HDLC SERPL: 2.6 {RATIO} (ref 0–5)
LDLC SERPL CALC-MCNC: 88.8 MG/DL (ref 0–100)
POTASSIUM SERPL-SCNC: 3.6 MMOL/L (ref 3.5–5.1)
PROT SERPL-MCNC: 6.5 G/DL (ref 6.4–8.2)
SODIUM SERPL-SCNC: 141 MMOL/L (ref 136–145)
TRIGL SERPL-MCNC: 121 MG/DL (ref ?–150)
VLDLC SERPL CALC-MCNC: 24.2 MG/DL

## 2022-12-16 ENCOUNTER — TELEPHONE (OUTPATIENT)
Dept: FAMILY MEDICINE CLINIC | Age: 77
End: 2022-12-16

## 2022-12-16 NOTE — TELEPHONE ENCOUNTER
My Chart message sent to pt:  I reviewed recent DEXA scan done at [facility] on 12/5/22. Bone density test shows osteopenia, which is the \"pre-osteoporosis\" stage. There has been 3.4% increase in bon density in lumbar spine and 2.2% increase in bilateral hips, which is good. However, your fracture remains high enough to remain on treatment for osteoporosis given your family history of osteoporosis. Since it has been more than 8 years of you being on Evista, I suggest that we consider switching you to another medication that has less long term side effects called Prolia. It's a twice a year subcutaneous injection that can be set up at the outpatient infusion center. Evista is associated with higher risk of blood clots so I would like to switch you to something else. Let me know your thoughts.

## 2023-01-04 DIAGNOSIS — Z13.820 SCREENING FOR OSTEOPOROSIS: ICD-10-CM

## 2023-01-04 DIAGNOSIS — Z78.0 POSTMENOPAUSAL: ICD-10-CM

## 2023-02-04 ENCOUNTER — APPOINTMENT (OUTPATIENT)
Dept: CT IMAGING | Age: 78
End: 2023-02-04
Attending: STUDENT IN AN ORGANIZED HEALTH CARE EDUCATION/TRAINING PROGRAM
Payer: MEDICARE

## 2023-02-04 ENCOUNTER — HOSPITAL ENCOUNTER (EMERGENCY)
Age: 78
Discharge: HOME | End: 2023-02-04
Attending: STUDENT IN AN ORGANIZED HEALTH CARE EDUCATION/TRAINING PROGRAM
Payer: MEDICARE

## 2023-02-04 VITALS
RESPIRATION RATE: 16 BRPM | BODY MASS INDEX: 33.12 KG/M2 | TEMPERATURE: 98.3 F | SYSTOLIC BLOOD PRESSURE: 154 MMHG | DIASTOLIC BLOOD PRESSURE: 73 MMHG | HEIGHT: 64 IN | OXYGEN SATURATION: 98 % | HEART RATE: 85 BPM | WEIGHT: 194 LBS

## 2023-02-04 DIAGNOSIS — E87.6 HYPOKALEMIA: ICD-10-CM

## 2023-02-04 DIAGNOSIS — K59.00 CONSTIPATION, UNSPECIFIED CONSTIPATION TYPE: Primary | ICD-10-CM

## 2023-02-04 LAB
ALBUMIN SERPL-MCNC: 3.8 G/DL (ref 3.5–5)
ALBUMIN/GLOB SERPL: 1.1 (ref 1.1–2.2)
ALP SERPL-CCNC: 51 U/L (ref 45–117)
ALT SERPL-CCNC: 36 U/L (ref 12–78)
ANION GAP SERPL CALC-SCNC: 7 MMOL/L (ref 5–15)
AST SERPL-CCNC: 33 U/L (ref 15–37)
BASOPHILS # BLD: 0.1 K/UL (ref 0–0.1)
BASOPHILS NFR BLD: 1 % (ref 0–1)
BILIRUB SERPL-MCNC: 0.4 MG/DL (ref 0.2–1)
BUN SERPL-MCNC: 13 MG/DL (ref 6–20)
BUN/CREAT SERPL: 16 (ref 12–20)
CALCIUM SERPL-MCNC: 9.6 MG/DL (ref 8.5–10.1)
CHLORIDE SERPL-SCNC: 99 MMOL/L (ref 97–108)
CO2 SERPL-SCNC: 32 MMOL/L (ref 21–32)
CREAT SERPL-MCNC: 0.82 MG/DL (ref 0.55–1.02)
DIFFERENTIAL METHOD BLD: ABNORMAL
EOSINOPHIL # BLD: 0 K/UL (ref 0–0.4)
EOSINOPHIL NFR BLD: 0 % (ref 0–7)
ERYTHROCYTE [DISTWIDTH] IN BLOOD BY AUTOMATED COUNT: 14.6 % (ref 11.5–14.5)
GLOBULIN SER CALC-MCNC: 3.4 G/DL (ref 2–4)
GLUCOSE SERPL-MCNC: 126 MG/DL (ref 65–100)
HCT VFR BLD AUTO: 43.6 % (ref 35–47)
HGB BLD-MCNC: 14.9 G/DL (ref 11.5–16)
IMM GRANULOCYTES # BLD AUTO: 0 K/UL (ref 0–0.04)
IMM GRANULOCYTES NFR BLD AUTO: 0 % (ref 0–0.5)
LYMPHOCYTES # BLD: 1.2 K/UL (ref 0.8–3.5)
LYMPHOCYTES NFR BLD: 14 % (ref 12–49)
MAGNESIUM SERPL-MCNC: 1.9 MG/DL (ref 1.6–2.4)
MCH RBC QN AUTO: 30.4 PG (ref 26–34)
MCHC RBC AUTO-ENTMCNC: 34.2 G/DL (ref 30–36.5)
MCV RBC AUTO: 89 FL (ref 80–99)
MONOCYTES # BLD: 0.6 K/UL (ref 0–1)
MONOCYTES NFR BLD: 7 % (ref 5–13)
NEUTS SEG # BLD: 6.9 K/UL (ref 1.8–8)
NEUTS SEG NFR BLD: 78 % (ref 32–75)
NRBC # BLD: 0 K/UL (ref 0–0.01)
NRBC BLD-RTO: 0 PER 100 WBC
PLATELET # BLD AUTO: 238 K/UL (ref 150–400)
PMV BLD AUTO: 9.8 FL (ref 8.9–12.9)
POTASSIUM SERPL-SCNC: 3.2 MMOL/L (ref 3.5–5.1)
PROT SERPL-MCNC: 7.2 G/DL (ref 6.4–8.2)
RBC # BLD AUTO: 4.9 M/UL (ref 3.8–5.2)
SODIUM SERPL-SCNC: 138 MMOL/L (ref 136–145)
WBC # BLD AUTO: 8.8 K/UL (ref 3.6–11)

## 2023-02-04 PROCEDURE — 99285 EMERGENCY DEPT VISIT HI MDM: CPT

## 2023-02-04 PROCEDURE — 74011000636 HC RX REV CODE- 636: Performed by: STUDENT IN AN ORGANIZED HEALTH CARE EDUCATION/TRAINING PROGRAM

## 2023-02-04 PROCEDURE — 83735 ASSAY OF MAGNESIUM: CPT

## 2023-02-04 PROCEDURE — 85025 COMPLETE CBC W/AUTO DIFF WBC: CPT

## 2023-02-04 PROCEDURE — 80053 COMPREHEN METABOLIC PANEL: CPT

## 2023-02-04 PROCEDURE — 74177 CT ABD & PELVIS W/CONTRAST: CPT

## 2023-02-04 PROCEDURE — 36415 COLL VENOUS BLD VENIPUNCTURE: CPT

## 2023-02-04 RX ORDER — POLYETHYLENE GLYCOL 3350 17 G/17G
17 POWDER, FOR SOLUTION ORAL DAILY
Qty: 30 EACH | Refills: 1 | Status: SHIPPED | OUTPATIENT
Start: 2023-02-04

## 2023-02-04 RX ADMIN — IOPAMIDOL 100 ML: 755 INJECTION, SOLUTION INTRAVENOUS at 14:04

## 2023-02-04 NOTE — ED TRIAGE NOTES
Patient arrives with c/o constipation for 5 days. Denies nausea, vomiting, abdominal pain. Reports going to [facility] and had xray done and was told she has a lot of stool in her clon. Was sent to the ER for enema.

## 2023-02-04 NOTE — DISCHARGE INSTRUCTIONS
Make sure you are drinking plenty of fluids and eating a well-balanced and high-fiber diet to help prevent constipation. Start taking MiraLAX daily to keep your bowels regular. If you continue to have issues with constipation you can always try over-the-counter suppository or enema to help with constipation.

## 2023-02-04 NOTE — ED NOTES
Administered soap suds enema. Patient states she felt relief after the soap suds enema and had a bowel movement.

## 2023-02-04 NOTE — ED PROVIDER NOTES
HPI     Date of Service:  2023    Patient:  Estanislao Nissen    Chief Complaint:  Constipation       HPI:  Estanislao Nissen is a 76 y.o.  female with a past medical history of hypertension, hyperlipidemia, osteopenia who presents for evaluation of constipation. Patient reports she has had on and off constipation for the last several weeks, reports last bowel movement was 5 days ago. States she was initially relieving her symptoms with a stool softener but no longer working. She took 1 dose of MiraLAX without any relief. Patient went to [facility] prior to arrival, had a KUB done and sent to the emergency department for further evaluation and enema. Notes previous history of , but no other abdominal surgeries. She denies any associated abdominal pain. No nausea or vomiting. No other recent illness. Past Medical History:   Diagnosis Date    Abnormal CT scan 3/2015 eval by Dr Noelle Green    ? infectious process on CT 16 f/u note  Pulmo Asso note 17    Advance directive in chart 2012    BCC (basal cell carcinoma) 2009    basal  cell dr Burak Dias q 6months 10/20/15    Breast calcifications on mammogram     Dysphagia     Hiatal hernia     History of chicken pox     Hypercholesterolemia     Osteopenia     7/14/15 dexa still shows osteopenia    Screening for glaucoma 12/15/2016    note from [facility] w/IOP    Seasonal allergies     Shingles     age 50's    Tubular adenoma of colon 2/3/12    dr Darline Geller 5 yr repeat    Vitamin D deficiency        Past Surgical History:   Procedure Laterality Date    COLONOSCOPY  2/3/12    5 yr repeat    ENDOSCOPY, COLON, DIAGNOSTIC      10 yr repeat.   [facility]    HX COLONOSCOPY  2017    10 yr repeat [facility]    c section    HX ORTHOPAEDIC      rotator cuff/rt shoulder reconstruction surgery    HX SKIN BIOPSY  2009    dr Burak Dias- basal cell    MD UNLISTED PROCEDURE BREAST      history of several biopsies, college, and last one in 1's         Family History:   Problem Relation Age of Onset    Heart Disease Mother     Heart Disease Father     Cancer Father         bladder    Cancer Brother         espohagus    Stroke Maternal Grandfather        Social History     Socioeconomic History    Marital status:      Spouse name: Not on file    Number of children: Not on file    Years of education: Not on file    Highest education level: Not on file   Occupational History    Not on file   Tobacco Use    Smoking status: Never     Passive exposure: Never    Smokeless tobacco: Never   Vaping Use    Vaping Use: Never used   Substance and Sexual Activity    Alcohol use: Yes     Alcohol/week: 5.8 standard drinks     Types: 7 Glasses of wine per week    Drug use: No    Sexual activity: Not Currently   Other Topics Concern    Not on file   Social History Narrative    Not on file     Social Determinants of Health     Financial Resource Strain: Low Risk     Difficulty of Paying Living Expenses: Not hard at all   Food Insecurity: No Food Insecurity    Worried About Running Out of Food in the Last Year: Never true    Ran Out of Food in the Last Year: Never true   Transportation Needs: Not on file   Physical Activity: Not on file   Stress: Not on file   Social Connections: Not on file   Intimate Partner Violence: Not on file   Housing Stability: Not on file         ALLERGIES: Sulfa (sulfonamide antibiotics), Actonel [risedronate], Erythromycin, and Pcn [penicillins]    Review of Systems   Constitutional:  Negative for chills and fever. HENT:  Negative for congestion and rhinorrhea. Eyes:  Negative for discharge and redness. Respiratory:  Negative for cough and shortness of breath. Gastrointestinal:  Positive for constipation. Negative for abdominal pain, diarrhea, nausea and vomiting. Neurological:  Negative for speech difficulty. Psychiatric/Behavioral:  Negative for agitation and confusion.       Vitals:    02/04/23 1214   BP: (!) 178/76   Pulse: 85   Resp: 16   Temp: 98.3 °F (36.8 °C)   SpO2: 98%   Weight: 88 kg (194 lb)   Height: 5' 4\" (1.626 m)            Physical Exam  Vitals and nursing note reviewed. Constitutional:       General: She is not in acute distress. Appearance: She is obese. She is not ill-appearing. HENT:      Head: Normocephalic. Eyes:      Extraocular Movements: Extraocular movements intact. Conjunctiva/sclera: Conjunctivae normal.   Cardiovascular:      Rate and Rhythm: Normal rate. Pulses: Normal pulses. Pulmonary:      Effort: Pulmonary effort is normal. No respiratory distress. Abdominal:      Palpations: Abdomen is soft. Tenderness: There is no abdominal tenderness. There is no guarding or rebound. Musculoskeletal:         General: Normal range of motion. Skin:     General: Skin is warm and dry. Capillary Refill: Capillary refill takes less than 2 seconds. Neurological:      General: No focal deficit present. Mental Status: She is alert and oriented to person, place, and time. Psychiatric:         Mood and Affect: Mood normal.         Behavior: Behavior normal.        Medical Decision Making  Amount and/or Complexity of Data Reviewed  Labs: ordered. Radiology: ordered.            Procedures moderate fecal stasis without any evidence of obstruction. There are other incidental findings as described in CT report. I discussed results with patient. Shared decision making regarding management in the emergency department versus outpatient management for her constipation. Patient elects to have attempt at fecal disimpaction with subsequent enema in the emergency department. I subsequently performed a MARK, there was somewhat firm but soft stool in the rectal vault and subsequent disimpaction performed. Enema was subsequently placed by nurse. After enema, patient able to have a bowel movement and had significant improvement of her symptoms. Patient was instructed on use of MiraLAX to prevent any further constipation, instructed on drinking plenty of water and a high-fiber diet. She was encouraged to follow-up with her GI doctor as well as primary care doctor for further management as needed. Strict ER return precautions discussed. She verbalized understanding and will be discharged home. Amount and/or Complexity of Data Reviewed  Labs: ordered. Decision-making details documented in ED Course. Radiology: ordered. Decision-making details documented in ED Course. Risk  OTC drugs. Prescription drug management. Procedures    LABS:  Recent Results (from the past 72 hour(s))   CBC WITH AUTOMATED DIFF    Collection Time: 02/04/23  1:18 PM   Result Value Ref Range    WBC 8.8 3.6 - 11.0 K/uL    RBC 4.90 3.80 - 5.20 M/uL    HGB 14.9 11.5 - 16.0 g/dL    HCT 43.6 35.0 - 47.0 %    MCV 89.0 80.0 - 99.0 FL    MCH 30.4 26.0 - 34.0 PG    MCHC 34.2 30.0 - 36.5 g/dL    RDW 14.6 (H) 11.5 - 14.5 %    PLATELET 322 509 - 315 K/uL    MPV 9.8 8.9 - 12.9 FL    NRBC 0.0 0  WBC    ABSOLUTE NRBC 0.00 0.00 - 0.01 K/uL    NEUTROPHILS 78 (H) 32 - 75 %    LYMPHOCYTES 14 12 - 49 %    MONOCYTES 7 5 - 13 %    EOSINOPHILS 0 0 - 7 %    BASOPHILS 1 0 - 1 %    IMMATURE GRANULOCYTES 0 0.0 - 0.5 %    ABS. NEUTROPHILS 6.9 1.8 - 8.0 K/UL    ABS. LYMPHOCYTES 1.2 0.8 - 3.5 K/UL    ABS. MONOCYTES 0.6 0.0 - 1.0 K/UL    ABS. EOSINOPHILS 0.0 0.0 - 0.4 K/UL    ABS. BASOPHILS 0.1 0.0 - 0.1 K/UL    ABS. IMM. GRANS. 0.0 0.00 - 0.04 K/UL    DF AUTOMATED     METABOLIC PANEL, COMPREHENSIVE    Collection Time: 02/04/23  1:18 PM   Result Value Ref Range    Sodium 138 136 - 145 mmol/L    Potassium 3.2 (L) 3.5 - 5.1 mmol/L    Chloride 99 97 - 108 mmol/L    CO2 32 21 - 32 mmol/L    Anion gap 7 5 - 15 mmol/L    Glucose 126 (H) 65 - 100 mg/dL    BUN 13 6 - 20 MG/DL    Creatinine 0.82 0.55 - 1.02 MG/DL    BUN/Creatinine ratio 16 12 - 20      eGFR >60 >60 ml/min/1.73m2    Calcium 9.6 8.5 - 10.1 MG/DL    Bilirubin, total 0.4 0.2 - 1.0 MG/DL    ALT (SGPT) 36 12 - 78 U/L    AST (SGOT) 33 15 - 37 U/L    Alk. phosphatase 51 45 - 117 U/L    Protein, total 7.2 6.4 - 8.2 g/dL    Albumin 3.8 3.5 - 5.0 g/dL    Globulin 3.4 2.0 - 4.0 g/dL    A-G Ratio 1.1 1.1 - 2.2     MAGNESIUM    Collection Time: 02/04/23  1:18 PM   Result Value Ref Range    Magnesium 1.9 1.6 - 2.4 mg/dL        IMAGING:  CT ABD PELV W CONT   Final Result   Moderate fecal stasis without evidence of obstruction. Sigmoid diverticulosis. No acute abnormality. Tiny gallstones. Endometrial prominence, please correlate   with any history of hormone replacement therapy as neoplasm cannot be excluded   in that setting. Medications During Visit:  Medications   iopamidoL (ISOVUE-370) 370 mg iodine /mL (76 %) injection 100 mL (100 mL IntraVENous Given 2/4/23 3884)         IMPRESSION:  1. Constipation, unspecified constipation type    2. Hypokalemia        DISPOSITION:  Discharged      Discharge Medication List as of 2/4/2023  4:34 PM        START taking these medications    Details   polyethylene glycol (Miralax) 17 gram packet Take 1 Packet by mouth daily. , Normal, Disp-30 Each, R-1           CONTINUE these medications which have NOT CHANGED    Details   atorvastatin (LIPITOR) 10 mg tablet Take 1 Tablet by mouth daily. Dose change, Normal, Disp-90 Tablet, R-3      atenoloL (TENORMIN) 25 mg tablet TAKE 1 TABLET DAILY, Normal, Disp-90 Tablet, R-3      indapamide (LOZOL) 2.5 mg tablet Take 1 Tablet by mouth daily. , Normal, Disp-90 Tablet, R-3      raloxifene (EVISTA) 60 mg tablet Take 1 Tablet by mouth daily. , Normal, Disp-90 Tablet, R-3      Biotin 2,500 mcg cap Take 5,000 mcg by mouth daily. , Historical Med      fexofenadine (ALLEGRA) 180 mg tablet Take 180 mg by mouth. Take half tablet as needed, Historical Med      cholecalciferol (VITAMIN D3) (2,000 UNITS /50 MCG) cap capsule Take 2,000 Units by mouth two (2) times a day., Historical Med      aspirin delayed-release 81 mg tablet Take 81 mg by mouth daily. m-w-f , Historical Med      MULTIVITAMINS (MULTIPLE VITAMIN PO) Take  by mouth daily. , Historical Med              Follow-up Information       Follow up With Specialties Details Why Contact Info    Laurie Lawrence MD Family Medicine Schedule an appointment as soon as possible for a visit   [address] [phone]      SP[facility] [address] [facility]  If symptoms worsen Sheyla Thomson [address] [facility] [facility] [phone]              The patient is asked to follow-up with their primary care provider in the next several days. They are to call tomorrow for an appointment. The patient is asked to return promptly for any increased concerns or worsening of symptoms. They can return to this emergency department or any other emergency department.       Chris King DO

## 2023-02-04 NOTE — ED NOTES
Critical care transport at the bedside to transfer patient to [facility] ED. Patient's friend who brought him in today was notified. Provided with GENE paperwork.

## 2023-02-10 ENCOUNTER — VIRTUAL VISIT (OUTPATIENT)
Dept: FAMILY MEDICINE CLINIC | Age: 78
End: 2023-02-10
Payer: MEDICARE

## 2023-02-10 DIAGNOSIS — R93.5 ABNORMAL FINDINGS ON DIAGNOSTIC IMAGING OF OTHER ABDOMINAL REGIONS, INCLUDING RETROPERITONEUM: ICD-10-CM

## 2023-02-10 DIAGNOSIS — K59.00 CONSTIPATION, UNSPECIFIED CONSTIPATION TYPE: Primary | ICD-10-CM

## 2023-02-10 NOTE — PROGRESS NOTES
Pura Contreras, was evaluated through a synchronous (real-time) audio-video encounter. The patient (or guardian if applicable) is aware that this is a billable service, which includes applicable co-pays. This Virtual Visit was conducted with patient's (and/or legal guardian's) consent. The visit was conducted pursuant to the emergency declaration under the [facility] [address] authority and the [employer] General Act. Patient identification was verified, and a caregiver was present when appropriate. The patient was located at: Home: [address]  [address] [facility] [address]  The provider was located at: Facility [facility] Department): [place] [address] Nona Tomlinson MD    Subjective:   Pura Contreras is a 76 y.o. F who was seen for:    Patient was recently seen in the emergency room due to severe constipation. Work-up included a CT abdomen pelvis which showed several findings including moderate fecal stasis, tiny gallstones, sigmoid diverticulosis, and endometrial prominence. She states after receiving the enema, the constipation has resolved. Not currently taking anything for her bowel movements as she is back to normal.  She does recall an episode of spotting several months ago and was certain it was from a vaginal source. Otherwise, has not had any vaginal bleeding. No longer sees OB/GYN. CT Results (most recent):  Results from Hospital Encounter encounter on 02/04/23    CT ABD PELV W CONT    Narrative  EXAM: CT ABD PELV W CONT    INDICATION: constipation r/o obstruction    COMPARISON: None    CONTRAST: 100 mL of Isovue-370. ORAL CONTRAST: None    TECHNIQUE:  Following the uneventful intravenous administration of contrast, thin axial  images were obtained through the abdomen and pelvis. Coronal and sagittal  reconstructions were generated.  CT dose reduction was achieved through use of a  standardized protocol tailored for this examination and automatic exposure  control for dose modulation. FINDINGS:  LOWER THORAX: No significant abnormality in the incidentally imaged lower chest.  LIVER: No mass. BILIARY TREE: Tiny gallstones. CBD is not dilated. SPLEEN: within normal limits. PANCREAS: No mass or ductal dilatation. ADRENALS: Unremarkable. KIDNEYS: No mass, calculus, or hydronephrosis. STOMACH: Unremarkable. SMALL BOWEL: No dilatation or wall thickening. COLON: No dilatation or wall thickening. Moderate fecal stasis. Sigmoid  diverticulosis. APPENDIX: Within normal limits. PERITONEUM: No ascites or pneumoperitoneum. RETROPERITONEUM: No lymphadenopathy or aortic aneurysm. REPRODUCTIVE ORGANS: Endometrial prominence. URINARY BLADDER: Decompressed. BONES: Degenerative changes are seen in the lumbar spine. ABDOMINAL WALL: No mass or hernia. ADDITIONAL COMMENTS: N/A    Impression  Moderate fecal stasis without evidence of obstruction. Sigmoid diverticulosis. No acute abnormality. Tiny gallstones. Endometrial prominence, please correlate  with any history of hormone replacement therapy as neoplasm cannot be excluded  in that setting. Current Outpatient Medications:     polyethylene glycol (Miralax) 17 gram packet, Take 1 Packet by mouth daily. , Disp: 30 Each, Rfl: 1    atorvastatin (LIPITOR) 10 mg tablet, Take 1 Tablet by mouth daily. Dose change, Disp: 90 Tablet, Rfl: 3    atenoloL (TENORMIN) 25 mg tablet, TAKE 1 TABLET DAILY, Disp: 90 Tablet, Rfl: 3    indapamide (LOZOL) 2.5 mg tablet, Take 1 Tablet by mouth daily. , Disp: 90 Tablet, Rfl: 3    raloxifene (EVISTA) 60 mg tablet, Take 1 Tablet by mouth daily. , Disp: 90 Tablet, Rfl: 3    Biotin 2,500 mcg cap, Take 5,000 mcg by mouth daily. , Disp: , Rfl:     fexofenadine (ALLEGRA) 180 mg tablet, Take 180 mg by mouth.  Take half tablet as needed, Disp: , Rfl:     cholecalciferol (VITAMIN D3) (2,000 UNITS /50 MCG) cap capsule, Take 2,000 Units by mouth two (2) times a day., Disp: , Rfl:     aspirin delayed-release 81 mg tablet, Take 81 mg by mouth daily. m-w-f , Disp: , Rfl:     MULTIVITAMINS (MULTIPLE VITAMIN PO), Take  by mouth daily. , Disp: , Rfl:     Allergies   Allergen Reactions    Sulfa (Sulfonamide Antibiotics) Rash and Itching    Actonel [Risedronate] Other (comments)     Gi upset    Erythromycin Other (comments)     GI upset    Pcn [Penicillins] Other (comments)     Pain in her hands       Review of Systems   Constitutional:  Negative for fever, malaise/fatigue and weight loss. Respiratory:  Negative for cough, hemoptysis, shortness of breath and wheezing. Cardiovascular:  Negative for chest pain, palpitations, leg swelling and PND. Gastrointestinal:  Negative for abdominal pain, constipation, diarrhea, nausea and vomiting.          Objective:     Patient-Reported Vitals 8/10/2020   Patient-Reported Weight 197   Patient-Reported Height 5'4\"   Patient-Reported Pulse 65   Patient-Reported Temperature 96.9   Patient-Reported SpO2 NA   Patient-Reported Systolic  808   Patient-Reported Diastolic 73   Patient-Reported Peak Flow NA   Patient-Reported LMP NA        Constitutional: [x] Appears well-developed and well-nourished [x] No apparent distress      [] Abnormal -     Mental status: [x] Alert and awake  [x] Oriented to person/place/time [x] Able to follow commands    [] Abnormal -     Eyes:   EOM    [x]  Normal    [] Abnormal -   Sclera  [x]  Normal    [] Abnormal -          Discharge []  None visible   [] Abnormal -     HENT: [x] Normocephalic, atraumatic  [] Abnormal -   [] Mouth/Throat: Mucous membranes are moist    Neck: [x] No visualized mass [] Abnormal -     Pulmonary/Chest: [x] Respiratory effort normal   [x] No visualized signs of difficulty breathing or respiratory distress        [] Abnormal -         Skin:        [x] No significant exanthematous lesions or discoloration noted on facial skin         [] Abnormal -            Psychiatric:       [x] Normal Affect [] Abnormal -        [x] No Hallucinations    Other pertinent observable physical exam findings:    Assessment & Plan:   Kemi Nuñez is a 76 y.o. female who presents today for:    1. Constipation, unspecified constipation type  Resolved. 2. Abnormal findings on diagnostic imaging of other abdominal regions, including retroperitoneum   Will evaluate further with pelvic ultrasound. - US PELV NON OBS; Future       There are no discontinued medications. Treatment risks/benefits/costs/interactions/alternatives discussed with patient. Advised patient to call back or return to office if symptoms worsen/change/persist. If patient cannot reach us or should anything more severe/urgent arise he/she should proceed directly to the nearest emergency department. Discussed expected course/resolution/complications of diagnosis in detail with patient. Patient expressed understanding with the diagnosis and plan. This dictation may have been completed with Dragon, the computer voice recognition software. Unanticipated grammatical, syntax, homophones, and other interpretive errors are sometimes inadvertently transcribed by the computer software. Please disregard any errors that have escaped final proofreading. Ganesh Higginbotham M.D.

## 2023-02-13 ENCOUNTER — HOSPITAL ENCOUNTER (OUTPATIENT)
Dept: ULTRASOUND IMAGING | Age: 78
Discharge: HOME | End: 2023-02-13
Attending: FAMILY MEDICINE
Payer: MEDICARE

## 2023-02-13 DIAGNOSIS — R93.5 ABNORMAL FINDINGS ON DIAGNOSTIC IMAGING OF OTHER ABDOMINAL REGIONS, INCLUDING RETROPERITONEUM: ICD-10-CM

## 2023-02-13 PROCEDURE — 76856 US EXAM PELVIC COMPLETE: CPT

## 2023-02-14 DIAGNOSIS — R93.89 ENDOMETRIAL THICKENING ON ULTRASOUND: Primary | ICD-10-CM

## 2023-02-14 NOTE — PROGRESS NOTES
Spoke to pt on phone re: ultrasound. Pt states that they weren't able to do a pelvic ultrasound. Given thicken endometrium, refer to obgyn for possible uterine biopsy. Pt will schedule appt.

## 2023-04-05 ENCOUNTER — OFFICE VISIT (OUTPATIENT)
Dept: FAMILY MEDICINE CLINIC | Age: 78
End: 2023-04-05
Payer: MEDICARE

## 2023-04-05 PROCEDURE — 1123F ACP DISCUSS/DSCN MKR DOCD: CPT | Performed by: FAMILY MEDICINE

## 2023-04-05 PROCEDURE — 1101F PT FALLS ASSESS-DOCD LE1/YR: CPT | Performed by: FAMILY MEDICINE

## 2023-04-05 PROCEDURE — 1090F PRES/ABSN URINE INCON ASSESS: CPT | Performed by: FAMILY MEDICINE

## 2023-04-05 PROCEDURE — 3078F DIAST BP <80 MM HG: CPT | Performed by: FAMILY MEDICINE

## 2023-04-05 PROCEDURE — G8536 NO DOC ELDER MAL SCRN: HCPCS | Performed by: FAMILY MEDICINE

## 2023-04-05 PROCEDURE — G8399 PT W/DXA RESULTS DOCUMENT: HCPCS | Performed by: FAMILY MEDICINE

## 2023-04-05 PROCEDURE — G8510 SCR DEP NEG, NO PLAN REQD: HCPCS | Performed by: FAMILY MEDICINE

## 2023-04-05 PROCEDURE — 3077F SYST BP >= 140 MM HG: CPT | Performed by: FAMILY MEDICINE

## 2023-04-05 PROCEDURE — 99213 OFFICE O/P EST LOW 20 MIN: CPT | Performed by: FAMILY MEDICINE

## 2023-04-05 PROCEDURE — G0463 HOSPITAL OUTPT CLINIC VISIT: HCPCS | Performed by: FAMILY MEDICINE

## 2023-04-05 PROCEDURE — G8417 CALC BMI ABV UP PARAM F/U: HCPCS | Performed by: FAMILY MEDICINE

## 2023-04-05 PROCEDURE — G8427 DOCREV CUR MEDS BY ELIG CLIN: HCPCS | Performed by: FAMILY MEDICINE

## 2023-04-05 NOTE — PROGRESS NOTES
Chief Complaint   Patient presents with    Medication Evaluation    Cholesterol Problem         1. \"Have you been to the ER, urgent care clinic since your last visit? Hospitalized since your last visit? \" Yes When: 2/4/23 [facility] Reason for visit: constipation    2. \"Have you seen or consulted any other health care providers outside of the [address] [place] [address] since your last visit? \" No     3. For patients over 39: Has the patient had a colorectal cancer screening? Yes - no Care Gap present     If the patient is female:    4. For patients over 40: Has the patient had a mammogram? Yes - no Care Gap present    5. For patients over 21: Has the patient had a pap smear?  Yes - no Care Gap present     3 most recent PHQ Screens 4/5/2023   Little interest or pleasure in doing things Not at all   Feeling down, depressed, irritable, or hopeless Not at all   Total Score PHQ 2 0       Health Maintenance Due   Topic Date Due    COVID-19 Vaccine (5 - Booster for Pfizer series) 07/13/2022

## 2023-04-05 NOTE — PROGRESS NOTES
Patient Name: Fili Paris   MRN: [id number]    Nieves Copeland is a [de-identified] y.o. female who presents with the following:     Blood pressure readings are usually normal at home. She has an upcoming appointment with Dr. Mode Marsh with OB/GYN due to endometrial thickening noted on imaging. US Results (most recent):  Results from [place] encounter on 02/13/23    US PELV NON OBS    Narrative  EXAM:  US PELV NON OBS    INDICATION:  Endometrial prominence on CT    COMPARISON:  CT 2/4/2023    TECHNIQUE: Transabdominal and transvaginal ultrasound of the female pelvis. FINDINGS:  Transabdominal ultrasound:  Urinary bladder: within normal limits. Uterus: measures 8.5 x 2.7 x 3.5 cm, which is within normal limits. There is no  sonographic myometrial abnormality. Endometrium: 12 mm    The ovaries are not seen due to bowel gas and postmenopausal state. Free fluid: None. Endovaginal ultrasound: The uterus and ovaries are not seen due to bowel gas and body habitus. No free  fluid. Impression  1. Nonspecific endometrial thickening measuring 12 mm. 2. Nonvisualized ovaries. BP Readings from Last 3 Encounters:   04/05/23 (!) 147/77   02/04/23 (!) 154/73   10/03/22 124/74     Wt Readings from Last 3 Encounters:   04/05/23 193 lb (87.5 kg)   02/04/23 194 lb (88 kg)   10/03/22 197 lb 12.8 oz (89.7 kg)     Review of Systems   Constitutional:  Negative for fever, malaise/fatigue and weight loss. Respiratory:  Negative for cough, hemoptysis, shortness of breath and wheezing. Cardiovascular:  Negative for chest pain, palpitations, leg swelling and PND. Gastrointestinal:  Negative for abdominal pain, constipation, diarrhea, nausea and vomiting. The patient's medications, allergies, past medical history, surgical history, family history and social history were reviewed and updated where appropriate.       Current Outpatient Medications:     atorvastatin (LIPITOR) 10 mg tablet, Take 1 Tablet by mouth daily. Dose change, Disp: 90 Tablet, Rfl: 3    atenoloL (TENORMIN) 25 mg tablet, TAKE 1 TABLET DAILY, Disp: 90 Tablet, Rfl: 3    indapamide (LOZOL) 2.5 mg tablet, Take 1 Tablet by mouth daily. , Disp: 90 Tablet, Rfl: 3    raloxifene (EVISTA) 60 mg tablet, Take 1 Tablet by mouth daily. , Disp: 90 Tablet, Rfl: 3    Biotin 2,500 mcg cap, Take 5,000 mcg by mouth daily. , Disp: , Rfl:     fexofenadine (ALLEGRA) 180 mg tablet, Take 1 Tablet by mouth. Take half tablet as needed, Disp: , Rfl:     cholecalciferol (VITAMIN D3) (2,000 UNITS /50 MCG) cap capsule, Take 1 Capsule by mouth two (2) times a day., Disp: , Rfl:     aspirin delayed-release 81 mg tablet, Take 1 Tablet by mouth daily. m-w-f, Disp: , Rfl:     MULTIVITAMINS (MULTIPLE VITAMIN PO), Take  by mouth daily. , Disp: , Rfl:     polyethylene glycol (Miralax) 17 gram packet, Take 1 Packet by mouth daily. , Disp: 30 Each, Rfl: 1    Allergies   Allergen Reactions    Sulfa (Sulfonamide Antibiotics) Rash and Itching    Actonel [Risedronate] Other (comments)     Gi upset    Erythromycin Other (comments)     GI upset    Pcn [Penicillins] Other (comments)     Pain in her hands         OBJECTIVE    Visit Vitals  BP (!) 147/77 (BP 1 Location: Left lower arm, BP Patient Position: Sitting, BP Cuff Size: Adult)   Pulse 81   Temp 98.4 °F (36.9 °C) (Temporal)   Resp 16   Ht 5' 4\" (1.626 m)   Wt 193 lb (87.5 kg)   LMP  (LMP Unknown)   SpO2 95%   BMI 33.13 kg/m²       Physical Exam  Vitals and nursing note reviewed. Constitutional:       General: She is not in acute distress. Appearance: Normal appearance. She is not toxic-appearing. HENT:      Head: Normocephalic and atraumatic. Eyes:      Pupils: Pupils are equal, round, and reactive to light. Pulmonary:      Effort: Pulmonary effort is normal.   Musculoskeletal:         General: Normal range of motion. Skin:     General: Skin is warm and dry. Neurological:      Mental Status: She is alert.  Mental status is at baseline. Psychiatric:         Mood and Affect: Mood normal.         Behavior: Behavior normal.         ASSESSMENT AND PLAN  Josee Godoy is a [de-identified] y.o. female who presents today for:    1. HTN, goal below 150/90  Normal at home; continue to monitor. 2. Endometrial thickening on ultrasound  Pt to see obgyn. Medications Discontinued During This Encounter   Medication Reason    polyethylene glycol (Miralax) 17 gram packet LIST CLEANUP       Follow-up and Dispositions    Return in about 6 months (around 10/5/2023) for Medicare Wellness Visit (30 min). Treatment risks/benefits/costs/interactions/alternatives discussed with patient. Advised patient to call back or return to office if symptoms worsen/change/persist. If patient cannot reach us or should anything more severe/urgent arise he/she should proceed directly to the nearest emergency department. Discussed expected course/resolution/complications of diagnosis in detail with patient. Patient expressed understanding with the diagnosis and plan. This dictation may have been completed with Dragon, the computer voice recognition software. Unanticipated grammatical, syntax, homophones, and other interpretive errors are sometimes inadvertently transcribed by the computer software. Please disregard any errors that have escaped final proofreading. Ganesh Wellington M.D.

## 2023-09-07 ENCOUNTER — TELEPHONE (OUTPATIENT)
Age: 78
End: 2023-09-07

## 2023-09-07 NOTE — TELEPHONE ENCOUNTER
Tried to contact pt no answer and VM box full/Sent Twined[employer] message to offer a lab appt 9/27 or 9/28. Fasting labs were ordered by Dr. Tete Isabel. -TM 9/7/23

## 2023-09-07 NOTE — TELEPHONE ENCOUNTER
Regarding: Schedule Labs  Contact: [phone]  ----- Message from Mairan Tavares MD sent at 9/7/2023 12:08 PM EDT -----  Please schedule lab appt     ----- Message from Monse Lucas to Marian Tavares MD sent at 9/6/2023 12:29 PM -----   I would like lab work drawn prior to my annual Medicare Physical with you on Oct. 5th.     Mary Martin

## 2023-09-11 ENCOUNTER — TELEPHONE (OUTPATIENT)
Age: 78
End: 2023-09-11

## 2023-09-11 NOTE — TELEPHONE ENCOUNTER
----- Message from WILEX sent at 9/11/2023  9:17 AM EDT -----  Subject: Message to Provider    QUESTIONS  Information for Provider? Patient called to reschedule lab appointment due   to the lack of transportation. Please contact the patient.   ---------------------------------------------------------------------------  --------------  [place]ino [place] [facility] [place]  [phone]; OK to leave message on voicemail  ---------------------------------------------------------------------------  --------------  SCRIPT ANSWERS  Relationship to Patient?  Self

## 2023-09-14 ENCOUNTER — NURSE ONLY (OUTPATIENT)
Age: 78
End: 2023-09-14

## 2023-09-14 DIAGNOSIS — E78.00 PURE HYPERCHOLESTEROLEMIA, UNSPECIFIED: ICD-10-CM

## 2023-09-14 DIAGNOSIS — R73.03 PREDIABETES: ICD-10-CM

## 2023-09-14 DIAGNOSIS — E55.9 VITAMIN D DEFICIENCY, UNSPECIFIED: ICD-10-CM

## 2023-09-15 LAB
25(OH)D3 SERPL-MCNC: 46.8 NG/ML (ref 30–100)
ALBUMIN SERPL-MCNC: 3.5 G/DL (ref 3.5–5)
ALBUMIN/GLOB SERPL: 1.3 (ref 1.1–2.2)
ALP SERPL-CCNC: 49 U/L (ref 45–117)
ALT SERPL-CCNC: 31 U/L (ref 12–78)
ANION GAP SERPL CALC-SCNC: 5 MMOL/L (ref 5–15)
AST SERPL-CCNC: 25 U/L (ref 15–37)
BILIRUB SERPL-MCNC: 0.5 MG/DL (ref 0.2–1)
BUN SERPL-MCNC: 13 MG/DL (ref 6–20)
BUN/CREAT SERPL: 19 (ref 12–20)
CALCIUM SERPL-MCNC: 9.2 MG/DL (ref 8.5–10.1)
CHLORIDE SERPL-SCNC: 104 MMOL/L (ref 97–108)
CHOLEST SERPL-MCNC: 173 MG/DL
CO2 SERPL-SCNC: 31 MMOL/L (ref 21–32)
CREAT SERPL-MCNC: 0.67 MG/DL (ref 0.55–1.02)
ERYTHROCYTE [DISTWIDTH] IN BLOOD BY AUTOMATED COUNT: 15 % (ref 11.5–14.5)
EST. AVERAGE GLUCOSE BLD GHB EST-MCNC: 123 MG/DL
GLOBULIN SER CALC-MCNC: 2.7 G/DL (ref 2–4)
GLUCOSE SERPL-MCNC: 93 MG/DL (ref 65–100)
HBA1C MFR BLD: 5.9 % (ref 4–5.6)
HCT VFR BLD AUTO: 41.9 % (ref 35–47)
HDLC SERPL-MCNC: 70 MG/DL
HDLC SERPL: 2.5 (ref 0–5)
HGB BLD-MCNC: 13.9 G/DL (ref 11.5–16)
LDLC SERPL CALC-MCNC: 83.2 MG/DL (ref 0–100)
MCH RBC QN AUTO: 31.2 PG (ref 26–34)
MCHC RBC AUTO-ENTMCNC: 33.2 G/DL (ref 30–36.5)
MCV RBC AUTO: 94.2 FL (ref 80–99)
NRBC # BLD: 0 K/UL (ref 0–0.01)
NRBC BLD-RTO: 0 PER 100 WBC
PLATELET # BLD AUTO: 234 K/UL (ref 150–400)
PMV BLD AUTO: 10.2 FL (ref 8.9–12.9)
POTASSIUM SERPL-SCNC: 3.7 MMOL/L (ref 3.5–5.1)
PROT SERPL-MCNC: 6.2 G/DL (ref 6.4–8.2)
RBC # BLD AUTO: 4.45 M/UL (ref 3.8–5.2)
SODIUM SERPL-SCNC: 140 MMOL/L (ref 136–145)
TRIGL SERPL-MCNC: 99 MG/DL
VLDLC SERPL CALC-MCNC: 19.8 MG/DL
WBC # BLD AUTO: 6.6 K/UL (ref 3.6–11)

## 2023-10-05 ENCOUNTER — OFFICE VISIT (OUTPATIENT)
Age: 78
End: 2023-10-05
Payer: MEDICARE

## 2023-10-05 VITALS
DIASTOLIC BLOOD PRESSURE: 80 MMHG | TEMPERATURE: 97.3 F | WEIGHT: 189.4 LBS | OXYGEN SATURATION: 97 % | HEIGHT: 64 IN | BODY MASS INDEX: 32.33 KG/M2 | RESPIRATION RATE: 14 BRPM | HEART RATE: 93 BPM | SYSTOLIC BLOOD PRESSURE: 138 MMHG

## 2023-10-05 DIAGNOSIS — Z00.00 ENCOUNTER FOR MEDICARE ANNUAL WELLNESS EXAM: Primary | ICD-10-CM

## 2023-10-05 DIAGNOSIS — R73.03 PREDIABETES: ICD-10-CM

## 2023-10-05 DIAGNOSIS — I10 ESSENTIAL (PRIMARY) HYPERTENSION: ICD-10-CM

## 2023-10-05 DIAGNOSIS — Z23 ENCOUNTER FOR IMMUNIZATION: ICD-10-CM

## 2023-10-05 DIAGNOSIS — E78.5 HYPERLIPIDEMIA, UNSPECIFIED HYPERLIPIDEMIA TYPE: ICD-10-CM

## 2023-10-05 PROCEDURE — G0439 PPPS, SUBSEQ VISIT: HCPCS | Performed by: FAMILY MEDICINE

## 2023-10-05 PROCEDURE — PBSHW INFLUENZA, FLUAD, (AGE 65 Y+), IM, PF, 0.5 ML: Performed by: FAMILY MEDICINE

## 2023-10-05 PROCEDURE — 3079F DIAST BP 80-89 MM HG: CPT | Performed by: FAMILY MEDICINE

## 2023-10-05 PROCEDURE — G8484 FLU IMMUNIZE NO ADMIN: HCPCS | Performed by: FAMILY MEDICINE

## 2023-10-05 PROCEDURE — PBSHW PBB SHADOW CHARGE: Performed by: FAMILY MEDICINE

## 2023-10-05 PROCEDURE — 90694 VACC AIIV4 NO PRSRV 0.5ML IM: CPT | Performed by: FAMILY MEDICINE

## 2023-10-05 PROCEDURE — 1123F ACP DISCUSS/DSCN MKR DOCD: CPT | Performed by: FAMILY MEDICINE

## 2023-10-05 PROCEDURE — 3075F SYST BP GE 130 - 139MM HG: CPT | Performed by: FAMILY MEDICINE

## 2023-10-05 SDOH — ECONOMIC STABILITY: FOOD INSECURITY: WITHIN THE PAST 12 MONTHS, THE FOOD YOU BOUGHT JUST DIDN'T LAST AND YOU DIDN'T HAVE MONEY TO GET MORE.: NEVER TRUE

## 2023-10-05 SDOH — ECONOMIC STABILITY: HOUSING INSECURITY
IN THE LAST 12 MONTHS, WAS THERE A TIME WHEN YOU DID NOT HAVE A STEADY PLACE TO SLEEP OR SLEPT IN A SHELTER (INCLUDING NOW)?: NO

## 2023-10-05 SDOH — ECONOMIC STABILITY: INCOME INSECURITY: HOW HARD IS IT FOR YOU TO PAY FOR THE VERY BASICS LIKE FOOD, HOUSING, MEDICAL CARE, AND HEATING?: NOT HARD AT ALL

## 2023-10-05 SDOH — ECONOMIC STABILITY: FOOD INSECURITY: WITHIN THE PAST 12 MONTHS, YOU WORRIED THAT YOUR FOOD WOULD RUN OUT BEFORE YOU GOT MONEY TO BUY MORE.: NEVER TRUE

## 2023-10-05 ASSESSMENT — PATIENT HEALTH QUESTIONNAIRE - PHQ9
SUM OF ALL RESPONSES TO PHQ QUESTIONS 1-9: 0
2. FEELING DOWN, DEPRESSED OR HOPELESS: 0
SUM OF ALL RESPONSES TO PHQ QUESTIONS 1-9: 0
SUM OF ALL RESPONSES TO PHQ9 QUESTIONS 1 & 2: 0
SUM OF ALL RESPONSES TO PHQ QUESTIONS 1-9: 0
SUM OF ALL RESPONSES TO PHQ QUESTIONS 1-9: 0
1. LITTLE INTEREST OR PLEASURE IN DOING THINGS: 0

## 2023-10-05 ASSESSMENT — LIFESTYLE VARIABLES
HOW OFTEN DURING THE LAST YEAR HAVE YOU NEEDED AN ALCOHOLIC DRINK FIRST THING IN THE MORNING TO GET YOURSELF GOING AFTER A NIGHT OF HEAVY DRINKING: 0
HOW OFTEN DURING THE LAST YEAR HAVE YOU FAILED TO DO WHAT WAS NORMALLY EXPECTED FROM YOU BECAUSE OF DRINKING: 0
HAS A RELATIVE, FRIEND, DOCTOR, OR ANOTHER HEALTH PROFESSIONAL EXPRESSED CONCERN ABOUT YOUR DRINKING OR SUGGESTED YOU CUT DOWN: 0
HAVE YOU OR SOMEONE ELSE BEEN INJURED AS A RESULT OF YOUR DRINKING: 0
HOW OFTEN DURING THE LAST YEAR HAVE YOU FOUND THAT YOU WERE NOT ABLE TO STOP DRINKING ONCE YOU HAD STARTED: 0
HOW OFTEN DURING THE LAST YEAR HAVE YOU HAD A FEELING OF GUILT OR REMORSE AFTER DRINKING: 0
HOW OFTEN DURING THE LAST YEAR HAVE YOU BEEN UNABLE TO REMEMBER WHAT HAPPENED THE NIGHT BEFORE BECAUSE YOU HAD BEEN DRINKING: 0
HOW OFTEN DO YOU HAVE A DRINK CONTAINING ALCOHOL: 4 OR MORE TIMES A WEEK
HOW MANY STANDARD DRINKS CONTAINING ALCOHOL DO YOU HAVE ON A TYPICAL DAY: 1 OR 2

## 2023-10-05 ASSESSMENT — ENCOUNTER SYMPTOMS
NAUSEA: 0
CONSTIPATION: 0
WHEEZING: 0
VOMITING: 0
CHEST TIGHTNESS: 0
COUGH: 0
ABDOMINAL PAIN: 0
SHORTNESS OF BREATH: 0
DIARRHEA: 0

## 2023-10-05 NOTE — PROGRESS NOTES
Patient Name: Rei Vasquez   MRN: [id number]    Marvel Bui is a 78 y.o. female who presents with the following:     Recent labs notable for prediabetes and well-controlled lipids. Otherwise doing well. BP Readings from Last 3 Encounters:   10/05/23 138/80   04/05/23 (!) 147/77   10/03/22 124/74       Wt Readings from Last 3 Encounters:   10/05/23 189 lb 6.4 oz (85.9 kg)   04/05/23 193 lb (87.5 kg)   10/03/22 197 lb 12.8 oz (89.7 kg)     Review of Systems   Constitutional:  Negative for activity change, appetite change, fatigue, fever and unexpected weight change. Respiratory:  Negative for cough, chest tightness, shortness of breath and wheezing. Cardiovascular:  Negative for chest pain, palpitations and leg swelling. Gastrointestinal:  Negative for abdominal pain, constipation, diarrhea, nausea and vomiting. Genitourinary:  Negative for dysuria, frequency and urgency. Skin:  Negative for rash. Neurological:  Negative for dizziness, weakness and headaches. Psychiatric/Behavioral:  Negative for dysphoric mood and suicidal ideas. The patient is not nervous/anxious. All other systems reviewed and are negative. The patient's medications, allergies, past medical history, surgical history, family history and social history were reviewed and updated where appropriate. Current Outpatient Medications on File Prior to Visit   Medication Sig Dispense Refill    aspirin 81 MG EC tablet Take 1 tablet by mouth daily 3 times a week.       atenolol (TENORMIN) 25 MG tablet Take 1 tablet by mouth daily      atorvastatin (LIPITOR) 10 MG tablet Take 1 tablet by mouth daily      Biotin 2.5 MG CAPS Take 5,000 mcg by mouth daily      Cholecalciferol 50 MCG (2000 UT) CAPS Take 1 capsule by mouth 2 times daily      fexofenadine (ALLEGRA) 180 MG tablet Take 1 tablet by mouth      indapamide (LOZOL) 2.5 MG tablet Take 1 tablet by mouth daily      raloxifene (EVISTA) 60 MG tablet Take 1 tablet

## 2024-01-20 ENCOUNTER — PATIENT MESSAGE (OUTPATIENT)
Age: 79
End: 2024-01-20

## 2024-01-21 RX ORDER — ATORVASTATIN CALCIUM 10 MG/1
10 TABLET, FILM COATED ORAL DAILY
Qty: 90 TABLET | Refills: 3 | Status: SHIPPED | OUTPATIENT
Start: 2024-01-21

## 2024-01-21 RX ORDER — ATENOLOL 25 MG/1
25 TABLET ORAL DAILY
Qty: 90 TABLET | Refills: 3 | Status: SHIPPED | OUTPATIENT
Start: 2024-01-21

## 2024-01-21 RX ORDER — INDAPAMIDE 2.5 MG/1
2.5 TABLET ORAL DAILY
Qty: 90 TABLET | Refills: 3 | Status: SHIPPED | OUTPATIENT
Start: 2024-01-21

## 2024-01-21 RX ORDER — RALOXIFENE HYDROCHLORIDE 60 MG/1
60 TABLET, FILM COATED ORAL DAILY
Qty: 90 TABLET | Refills: 3 | Status: SHIPPED | OUTPATIENT
Start: 2024-01-21

## 2024-04-15 ENCOUNTER — OFFICE VISIT (OUTPATIENT)
Age: 79
End: 2024-04-15
Payer: MEDICARE

## 2024-04-15 VITALS
WEIGHT: 189.8 LBS | RESPIRATION RATE: 14 BRPM | OXYGEN SATURATION: 97 % | HEART RATE: 83 BPM | SYSTOLIC BLOOD PRESSURE: 122 MMHG | DIASTOLIC BLOOD PRESSURE: 74 MMHG | TEMPERATURE: 98 F | HEIGHT: 64 IN | BODY MASS INDEX: 32.4 KG/M2

## 2024-04-15 DIAGNOSIS — I10 ESSENTIAL (PRIMARY) HYPERTENSION: Primary | ICD-10-CM

## 2024-04-15 PROCEDURE — 3078F DIAST BP <80 MM HG: CPT | Performed by: FAMILY MEDICINE

## 2024-04-15 PROCEDURE — 4004F PT TOBACCO SCREEN RCVD TLK: CPT | Performed by: FAMILY MEDICINE

## 2024-04-15 PROCEDURE — G8427 DOCREV CUR MEDS BY ELIG CLIN: HCPCS | Performed by: FAMILY MEDICINE

## 2024-04-15 PROCEDURE — 3074F SYST BP LT 130 MM HG: CPT | Performed by: FAMILY MEDICINE

## 2024-04-15 PROCEDURE — G8417 CALC BMI ABV UP PARAM F/U: HCPCS | Performed by: FAMILY MEDICINE

## 2024-04-15 PROCEDURE — 1090F PRES/ABSN URINE INCON ASSESS: CPT | Performed by: FAMILY MEDICINE

## 2024-04-15 PROCEDURE — 99213 OFFICE O/P EST LOW 20 MIN: CPT | Performed by: FAMILY MEDICINE

## 2024-04-15 PROCEDURE — 1123F ACP DISCUSS/DSCN MKR DOCD: CPT | Performed by: FAMILY MEDICINE

## 2024-04-15 PROCEDURE — G8399 PT W/DXA RESULTS DOCUMENT: HCPCS | Performed by: FAMILY MEDICINE

## 2024-04-15 ASSESSMENT — PATIENT HEALTH QUESTIONNAIRE - PHQ9
SUM OF ALL RESPONSES TO PHQ QUESTIONS 1-9: 0
1. LITTLE INTEREST OR PLEASURE IN DOING THINGS: NOT AT ALL
SUM OF ALL RESPONSES TO PHQ QUESTIONS 1-9: 0
2. FEELING DOWN, DEPRESSED OR HOPELESS: NOT AT ALL
SUM OF ALL RESPONSES TO PHQ9 QUESTIONS 1 & 2: 0

## 2024-04-15 ASSESSMENT — ENCOUNTER SYMPTOMS
CONSTIPATION: 0
WHEEZING: 0
CHEST TIGHTNESS: 0
SHORTNESS OF BREATH: 0
COUGH: 0
NAUSEA: 0
ABDOMINAL PAIN: 0
VOMITING: 0
DIARRHEA: 0

## 2024-04-15 NOTE — PROGRESS NOTES
Chief Complaint   Patient presents with    Follow-up     \"Have you been to the ER, urgent care clinic since your last visit?  Hospitalized since your last visit?\"    NO    “Have you seen or consulted any other health care providers outside of [facility] since your last visit?”    NO      Financial Resource Strain: Low Risk  (10/5/2023)    Overall Financial Resource Strain (CARDIA)     Difficulty of Paying Living Expenses: Not hard at all      Food Insecurity: Not on file (10/5/2023)            4/15/2024     1:47 PM   PHQ-9    Little interest or pleasure in doing things 0   Feeling down, depressed, or hopeless 0   PHQ-2 Score 0   PHQ-9 Total Score 0       Health Maintenance Due   Topic Date Due    Respiratory Syncytial Virus (RSV) Pregnant or age 60 yrs+ (1 - 1-dose 60+ series) Never done    COVID-19 Vaccine (7 - 2023-24 season) 11/23/2023             
raloxifene (EVISTA) 60 MG tablet Take 1 tablet by mouth daily 90 tablet 3    aspirin 81 MG EC tablet Take 1 tablet by mouth daily 3 times a week.      Biotin 2.5 MG CAPS Take 5,000 mcg by mouth daily      Cholecalciferol 50 MCG (2000 UT) CAPS Take 1 capsule by mouth 2 times daily      fexofenadine (ALLEGRA) 180 MG tablet Take 1 tablet by mouth       No current facility-administered medications on file prior to visit.       Allergies   Allergen Reactions    Sulfa Antibiotics Itching and Rash    Erythromycin Other (See Comments)     GI upset    Penicillins Other (See Comments)     Pain in her hands    Risedronate Other (See Comments)     Gi upset         OBJECTIVE    Visit Vitals  /74 (Site: Right Upper Arm, Position: Sitting, Cuff Size: Medium Adult)   Pulse 83   Temp 98 °F (36.7 °C) (Temporal)   Resp 14   Ht 1.626 m (5' 4\")   Wt 86.1 kg (189 lb 12.8 oz)   SpO2 97%   BMI 32.58 kg/m²       Physical Exam  Vitals and nursing note reviewed.   Constitutional:       General: She is awake.      Appearance: Normal appearance.   HENT:      Head: Normocephalic and atraumatic.      Right Ear: External ear normal.      Left Ear: External ear normal.      Nose: Nose normal.      Mouth/Throat:      Mouth: Mucous membranes are moist.   Eyes:      General: Lids are normal.      Extraocular Movements: Extraocular movements intact.      Conjunctiva/sclera: Conjunctivae normal.      Pupils: Pupils are equal, round, and reactive to light.   Pulmonary:      Effort: Pulmonary effort is normal. No tachypnea, bradypnea, accessory muscle usage or respiratory distress.   Skin:     General: Skin is warm and dry.      Findings: No rash.   Neurological:      General: No focal deficit present.      Mental Status: She is alert and oriented to person, place, and time. Mental status is at baseline.         Treatment risks/benefits/costs/interactions/alternatives discussed with patient.  Advised patient to call back or return to office if

## 2024-06-03 ENCOUNTER — PATIENT MESSAGE (OUTPATIENT)
Age: 79
End: 2024-06-03

## 2024-06-03 RX ORDER — INDAPAMIDE 2.5 MG/1
2.5 TABLET ORAL DAILY
Qty: 90 TABLET | Refills: 3 | Status: SHIPPED | OUTPATIENT
Start: 2024-06-03

## 2024-06-03 NOTE — TELEPHONE ENCOUNTER
PCP: Tomas Choi MD    Last appt: 4/15/2024       Future Appointments   Date Time Provider Department Center   10/17/2024  1:15 PM Tomas Choi MD [facility]       Requested Prescriptions     Pending Prescriptions Disp Refills    indapamide (LOZOL) 2.5 MG tablet 90 tablet 3     Sig: Take 1 tablet by mouth daily       Prior labs and Blood pressures:  BP Readings from Last 3 Encounters:   04/15/24 122/74   10/05/23 138/80   04/05/23 (!) 147/77     Lab Results   Component Value Date/Time     09/14/2023 09:25 AM    K 3.7 09/14/2023 09:25 AM     09/14/2023 09:25 AM    CO2 31 09/14/2023 09:25 AM    BUN 13 09/14/2023 09:25 AM    GFRAA >60 09/27/2022 10:24 AM     No results found for: \"HBA1C\", \"BDD2QEUX\"  Lab Results   Component Value Date/Time    CHOL 173 09/14/2023 09:25 AM    HDL 70 09/14/2023 09:25 AM    LDL 83.2 09/14/2023 09:25 AM    VLDL 19.8 09/14/2023 09:25 AM     No results found for: \"VITD3\"    Lab Results   Component Value Date/Time    TSH 3.910 07/23/2020 08:18 AM

## 2024-06-03 NOTE — TELEPHONE ENCOUNTER
From: Malorie Shane  To: Dr. Tomas Choi  Sent: 6/3/2024 10:39 AM EDT  Subject: Refill needed for 2.5mg Indapamide    Please write a 90day prescription to [facility] because there are no refills.  Telephone # [phone]    Thank you,   Malorie Shane

## 2024-09-30 ENCOUNTER — PATIENT MESSAGE (OUTPATIENT)
Age: 79
End: 2024-09-30

## 2024-09-30 DIAGNOSIS — E55.9 VITAMIN D DEFICIENCY, UNSPECIFIED: ICD-10-CM

## 2024-09-30 DIAGNOSIS — E78.5 HYPERLIPIDEMIA, UNSPECIFIED HYPERLIPIDEMIA TYPE: ICD-10-CM

## 2024-09-30 DIAGNOSIS — R73.03 PREDIABETES: Primary | ICD-10-CM

## 2024-10-04 ENCOUNTER — LAB (OUTPATIENT)
Age: 79
End: 2024-10-04

## 2024-10-04 DIAGNOSIS — R73.03 PREDIABETES: ICD-10-CM

## 2024-10-04 DIAGNOSIS — E55.9 VITAMIN D DEFICIENCY, UNSPECIFIED: ICD-10-CM

## 2024-10-04 DIAGNOSIS — E78.5 HYPERLIPIDEMIA, UNSPECIFIED HYPERLIPIDEMIA TYPE: ICD-10-CM

## 2024-10-04 LAB
25(OH)D3 SERPL-MCNC: 56.4 NG/ML (ref 30–100)
ALBUMIN SERPL-MCNC: 3.8 G/DL (ref 3.5–5)
ALBUMIN/GLOB SERPL: 1.4 (ref 1.1–2.2)
ALP SERPL-CCNC: 53 U/L (ref 45–117)
ALT SERPL-CCNC: 30 U/L (ref 12–78)
ANION GAP SERPL CALC-SCNC: 4 MMOL/L (ref 2–12)
AST SERPL-CCNC: 28 U/L (ref 15–37)
BILIRUB SERPL-MCNC: 0.6 MG/DL (ref 0.2–1)
BUN SERPL-MCNC: 14 MG/DL (ref 6–20)
BUN/CREAT SERPL: 21 (ref 12–20)
CALCIUM SERPL-MCNC: 9.6 MG/DL (ref 8.5–10.1)
CHLORIDE SERPL-SCNC: 104 MMOL/L (ref 97–108)
CHOLEST SERPL-MCNC: 183 MG/DL
CO2 SERPL-SCNC: 31 MMOL/L (ref 21–32)
CREAT SERPL-MCNC: 0.68 MG/DL (ref 0.55–1.02)
ERYTHROCYTE [DISTWIDTH] IN BLOOD BY AUTOMATED COUNT: 15.2 % (ref 11.5–14.5)
EST. AVERAGE GLUCOSE BLD GHB EST-MCNC: 123 MG/DL
GLOBULIN SER CALC-MCNC: 2.8 G/DL (ref 2–4)
GLUCOSE SERPL-MCNC: 106 MG/DL (ref 65–100)
HBA1C MFR BLD: 5.9 % (ref 4–5.6)
HCT VFR BLD AUTO: 42.5 % (ref 35–47)
HDLC SERPL-MCNC: 73 MG/DL
HDLC SERPL: 2.5 (ref 0–5)
HGB BLD-MCNC: 14.1 G/DL (ref 11.5–16)
LDLC SERPL CALC-MCNC: 84.4 MG/DL (ref 0–100)
MCH RBC QN AUTO: 30.3 PG (ref 26–34)
MCHC RBC AUTO-ENTMCNC: 33.2 G/DL (ref 30–36.5)
MCV RBC AUTO: 91.2 FL (ref 80–99)
NRBC # BLD: 0 K/UL (ref 0–0.01)
NRBC BLD-RTO: 0 PER 100 WBC
PLATELET # BLD AUTO: 259 K/UL (ref 150–400)
PMV BLD AUTO: 10.2 FL (ref 8.9–12.9)
POTASSIUM SERPL-SCNC: 3.5 MMOL/L (ref 3.5–5.1)
PROT SERPL-MCNC: 6.6 G/DL (ref 6.4–8.2)
RBC # BLD AUTO: 4.66 M/UL (ref 3.8–5.2)
SODIUM SERPL-SCNC: 139 MMOL/L (ref 136–145)
TRIGL SERPL-MCNC: 128 MG/DL
VLDLC SERPL CALC-MCNC: 25.6 MG/DL
WBC # BLD AUTO: 7 K/UL (ref 3.6–11)

## 2024-10-17 ENCOUNTER — OFFICE VISIT (OUTPATIENT)
Age: 79
End: 2024-10-17
Payer: MEDICARE

## 2024-10-17 VITALS
HEIGHT: 64 IN | BODY MASS INDEX: 31.96 KG/M2 | TEMPERATURE: 98 F | DIASTOLIC BLOOD PRESSURE: 66 MMHG | SYSTOLIC BLOOD PRESSURE: 126 MMHG | HEART RATE: 70 BPM | WEIGHT: 187.2 LBS | RESPIRATION RATE: 18 BRPM | OXYGEN SATURATION: 94 %

## 2024-10-17 DIAGNOSIS — R73.03 PREDIABETES: ICD-10-CM

## 2024-10-17 DIAGNOSIS — Z78.0 POSTMENOPAUSAL: ICD-10-CM

## 2024-10-17 DIAGNOSIS — Z23 ENCOUNTER FOR IMMUNIZATION: ICD-10-CM

## 2024-10-17 DIAGNOSIS — Z00.00 ENCOUNTER FOR MEDICARE ANNUAL WELLNESS EXAM: Primary | ICD-10-CM

## 2024-10-17 DIAGNOSIS — Z13.820 SCREENING FOR OSTEOPOROSIS: ICD-10-CM

## 2024-10-17 DIAGNOSIS — I10 ESSENTIAL (PRIMARY) HYPERTENSION: ICD-10-CM

## 2024-10-17 PROCEDURE — G0439 PPPS, SUBSEQ VISIT: HCPCS | Performed by: FAMILY MEDICINE

## 2024-10-17 PROCEDURE — 1123F ACP DISCUSS/DSCN MKR DOCD: CPT | Performed by: FAMILY MEDICINE

## 2024-10-17 PROCEDURE — PBSHW INFLUENZA, FLUAD TRIVALENT, (AGE 65 Y+), IM, PRESERVATIVE FREE, 0.5ML: Performed by: FAMILY MEDICINE

## 2024-10-17 PROCEDURE — 3078F DIAST BP <80 MM HG: CPT | Performed by: FAMILY MEDICINE

## 2024-10-17 PROCEDURE — 90653 IIV ADJUVANT VACCINE IM: CPT | Performed by: FAMILY MEDICINE

## 2024-10-17 PROCEDURE — G8482 FLU IMMUNIZE ORDER/ADMIN: HCPCS | Performed by: FAMILY MEDICINE

## 2024-10-17 PROCEDURE — 3074F SYST BP LT 130 MM HG: CPT | Performed by: FAMILY MEDICINE

## 2024-10-17 RX ORDER — ATENOLOL 25 MG/1
25 TABLET ORAL DAILY
Qty: 90 TABLET | Refills: 3 | Status: SHIPPED | OUTPATIENT
Start: 2024-10-17

## 2024-10-17 RX ORDER — INDAPAMIDE 2.5 MG/1
2.5 TABLET ORAL DAILY
Qty: 90 TABLET | Refills: 3 | Status: SHIPPED | OUTPATIENT
Start: 2024-10-17

## 2024-10-17 RX ORDER — ATORVASTATIN CALCIUM 10 MG/1
10 TABLET, FILM COATED ORAL DAILY
Qty: 90 TABLET | Refills: 3 | Status: SHIPPED | OUTPATIENT
Start: 2024-10-17

## 2024-10-17 RX ORDER — RALOXIFENE HYDROCHLORIDE 60 MG/1
60 TABLET, FILM COATED ORAL DAILY
Qty: 90 TABLET | Refills: 3 | Status: SHIPPED | OUTPATIENT
Start: 2024-10-17

## 2024-10-17 SDOH — ECONOMIC STABILITY: INCOME INSECURITY: HOW HARD IS IT FOR YOU TO PAY FOR THE VERY BASICS LIKE FOOD, HOUSING, MEDICAL CARE, AND HEATING?: NOT VERY HARD

## 2024-10-17 SDOH — ECONOMIC STABILITY: FOOD INSECURITY: WITHIN THE PAST 12 MONTHS, YOU WORRIED THAT YOUR FOOD WOULD RUN OUT BEFORE YOU GOT MONEY TO BUY MORE.: NEVER TRUE

## 2024-10-17 SDOH — ECONOMIC STABILITY: FOOD INSECURITY: WITHIN THE PAST 12 MONTHS, THE FOOD YOU BOUGHT JUST DIDN'T LAST AND YOU DIDN'T HAVE MONEY TO GET MORE.: NEVER TRUE

## 2024-10-17 ASSESSMENT — PATIENT HEALTH QUESTIONNAIRE - PHQ9
SUM OF ALL RESPONSES TO PHQ QUESTIONS 1-9: 0
1. LITTLE INTEREST OR PLEASURE IN DOING THINGS: NOT AT ALL
SUM OF ALL RESPONSES TO PHQ QUESTIONS 1-9: 0
SUM OF ALL RESPONSES TO PHQ QUESTIONS 1-9: 0
2. FEELING DOWN, DEPRESSED OR HOPELESS: NOT AT ALL
SUM OF ALL RESPONSES TO PHQ9 QUESTIONS 1 & 2: 0
SUM OF ALL RESPONSES TO PHQ QUESTIONS 1-9: 0

## 2024-10-17 ASSESSMENT — ENCOUNTER SYMPTOMS
VOMITING: 0
CONSTIPATION: 0
ABDOMINAL PAIN: 0
WHEEZING: 0
CHEST TIGHTNESS: 0
DIARRHEA: 0
NAUSEA: 0
SHORTNESS OF BREATH: 0
COUGH: 0

## 2024-10-17 ASSESSMENT — LIFESTYLE VARIABLES
HOW MANY STANDARD DRINKS CONTAINING ALCOHOL DO YOU HAVE ON A TYPICAL DAY: 1 OR 2
HOW OFTEN DO YOU HAVE A DRINK CONTAINING ALCOHOL: 2-3 TIMES A WEEK

## 2024-10-17 NOTE — PROGRESS NOTES
Medicare Annual Wellness Visit    Malorie Shane is here for Medicare AWV    Assessment & Plan   Encounter for Medicare annual wellness exam  Prediabetes  Essential (primary) hypertension  Screening for osteoporosis  -     DEXA BONE DENSITY AXIAL SKELETON; Future  Postmenopausal  -     DEXA BONE DENSITY AXIAL SKELETON; Future  Encounter for immunization  -     Influenza, FLUAD Trivalent, (age 65 y+), IM, Preservative Free, 0.5mL     Recommendations for Preventive Services Due: see orders and patient instructions/AVS.  Recommended screening schedule for the next 5-10 years is provided to the patient in written form: see Patient Instructions/AVS.     Return in about 6 months (around 4/17/2025) for HTN.     Subjective     Patient's complete Health Risk Assessment and screening values have been reviewed and are found in Flowsheets. The following problems were reviewed today and where indicated follow up appointments were made and/or referrals ordered.    Positive Risk Factor Screenings with Interventions:                Inactivity:  On average, how many days per week do you engage in moderate to strenuous exercise (like a brisk walk)?: 0 days (!) Abnormal  On average, how many minutes do you engage in exercise at this level?: 0 min  Interventions:  Patient declined any further interventions or treatment     Abnormal BMI (obese):  Body mass index is 32.13 kg/m². (!) Abnormal  Interventions:  Patient declines any further evaluation or treatment          Objective   Vitals:    10/17/24 1309   BP: 126/66   Site: Left Upper Arm   Position: Sitting   Cuff Size: Medium Adult   Pulse: 70   Resp: 18   Temp: 98 °F (36.7 °C)   TempSrc: Temporal   SpO2: 94%   Weight: 84.9 kg (187 lb 3.2 oz)   Height: 1.626 m (5' 4\")      Body mass index is 32.13 kg/m².              Allergies   Allergen Reactions    Sulfa Antibiotics Itching and Rash    Erythromycin Other (See Comments)     GI upset    Penicillins Other (See Comments)     Pain in  Tylenol or motrin every 6 hours as needed for pain

## 2024-10-17 NOTE — PROGRESS NOTES
Chief Complaint   Patient presents with    Medicare AWV     \"Have you been to the ER, urgent care clinic since your last visit?  Hospitalized since your last visit?\"    NO    “Have you seen or consulted any other health care providers outside of [facility] since your last visit?”    NO       Financial Resource Strain: Low Risk  (10/17/2024)    Overall Financial Resource Strain (CARDIA)     Difficulty of Paying Living Expenses: Not very hard      Food Insecurity: No Food Insecurity (10/17/2024)    Hunger Vital Sign     Worried About Running Out of Food in the Last Year: Never true     Ran Out of Food in the Last Year: Never true            10/17/2024     1:00 PM   PHQ-9    Little interest or pleasure in doing things 0   Feeling down, depressed, or hopeless 0   PHQ-2 Score 0   PHQ-9 Total Score 0       Health Maintenance Due   Topic Date Due    Respiratory Syncytial Virus (RSV) Pregnant or age 60 yrs+ (1 - 1-dose 60+ series) Never done    Flu vaccine (1) 08/01/2024    COVID-19 Vaccine (7 - 2023-24 season) 09/01/2024    Annual Wellness Visit (Medicare)  10/05/2024

## 2024-10-17 NOTE — PROGRESS NOTES
Patient Name: Malorie Shane   MRN: [id number]    ASSESSMENT AND PLAN  Malorie Shane is a 79 y.o. female who presents today for:    1. Encounter for Medicare annual wellness exam    2. Prediabetes  I would encourage healthy food choices and regular exercise before starting medications for this.    3. Essential (primary) hypertension  Stable, continue current treatment.    4. Screening for osteoporosis  - DEXA BONE DENSITY AXIAL SKELETON; Future    5. Postmenopausal  - DEXA BONE DENSITY AXIAL SKELETON; Future    6. Encounter for immunization  - Influenza, FLUAD Trivalent, (age 65 y+), IM, Preservative Free, 0.5mL      Orders Placed This Encounter   Medications    raloxifene (EVISTA) 60 MG tablet     Sig: Take 1 tablet by mouth daily     Dispense:  90 tablet     Refill:  3    indapamide (LOZOL) 2.5 MG tablet     Sig: Take 1 tablet by mouth daily     Dispense:  90 tablet     Refill:  3    atorvastatin (LIPITOR) 10 MG tablet     Sig: Take 1 tablet by mouth daily     Dispense:  90 tablet     Refill:  3    atenolol (TENORMIN) 25 MG tablet     Sig: Take 1 tablet by mouth daily     Dispense:  90 tablet     Refill:  3        Medications Discontinued During This Encounter   Medication Reason    atenolol (TENORMIN) 25 MG tablet REORDER    atorvastatin (LIPITOR) 10 MG tablet REORDER    raloxifene (EVISTA) 60 MG tablet REORDER    indapamide (LOZOL) 2.5 MG tablet REORDER       Return in about 6 months (around 4/17/2025) for HTN.    SUBJECTIVE  Malorie Shane is a 79 y.o. female who presents with the following:     DEXA: due for Dexa this December; on Evista    CAD risk factors:  HTN: wnl on meds  BP Readings from Last 3 Encounters:   10/17/24 126/66   04/15/24 122/74   10/05/23 138/80     Lipid: on statin  Lab Results   Component Value Date    CHOL 183 10/04/2024    TRIG 128 10/04/2024    HDL 73 10/04/2024    LDL 84.4 10/04/2024    VLDL 25.6 10/04/2024    CHOLHDLRATIO 2.5 10/04/2024       The ASCVD Risk score (Claire DK, et

## 2024-12-23 DIAGNOSIS — Z78.0 POSTMENOPAUSAL: ICD-10-CM

## 2024-12-23 DIAGNOSIS — Z13.820 SCREENING FOR OSTEOPOROSIS: ICD-10-CM

## 2024-12-31 ENCOUNTER — PATIENT MESSAGE (OUTPATIENT)
Age: 79
End: 2024-12-31

## 2024-12-31 DIAGNOSIS — M81.0 OSTEOPOROSIS, UNSPECIFIED OSTEOPOROSIS TYPE, UNSPECIFIED PATHOLOGICAL FRACTURE PRESENCE: Primary | ICD-10-CM

## 2025-04-16 SDOH — ECONOMIC STABILITY: FOOD INSECURITY: WITHIN THE PAST 12 MONTHS, YOU WORRIED THAT YOUR FOOD WOULD RUN OUT BEFORE YOU GOT MONEY TO BUY MORE.: NEVER TRUE

## 2025-04-16 SDOH — ECONOMIC STABILITY: FOOD INSECURITY: WITHIN THE PAST 12 MONTHS, THE FOOD YOU BOUGHT JUST DIDN'T LAST AND YOU DIDN'T HAVE MONEY TO GET MORE.: NEVER TRUE

## 2025-04-16 SDOH — ECONOMIC STABILITY: INCOME INSECURITY: IN THE LAST 12 MONTHS, WAS THERE A TIME WHEN YOU WERE NOT ABLE TO PAY THE MORTGAGE OR RENT ON TIME?: NO

## 2025-04-16 SDOH — ECONOMIC STABILITY: TRANSPORTATION INSECURITY
IN THE PAST 12 MONTHS, HAS LACK OF TRANSPORTATION KEPT YOU FROM MEETINGS, WORK, OR FROM GETTING THINGS NEEDED FOR DAILY LIVING?: NO

## 2025-04-16 SDOH — ECONOMIC STABILITY: TRANSPORTATION INSECURITY
IN THE PAST 12 MONTHS, HAS THE LACK OF TRANSPORTATION KEPT YOU FROM MEDICAL APPOINTMENTS OR FROM GETTING MEDICATIONS?: NO

## 2025-04-17 ENCOUNTER — OFFICE VISIT (OUTPATIENT)
Age: 80
End: 2025-04-17
Payer: MEDICARE

## 2025-04-17 VITALS
HEART RATE: 73 BPM | SYSTOLIC BLOOD PRESSURE: 124 MMHG | OXYGEN SATURATION: 96 % | RESPIRATION RATE: 18 BRPM | DIASTOLIC BLOOD PRESSURE: 70 MMHG | TEMPERATURE: 97.5 F | WEIGHT: 186.4 LBS | BODY MASS INDEX: 31.82 KG/M2 | HEIGHT: 64 IN

## 2025-04-17 DIAGNOSIS — R73.03 PREDIABETES: ICD-10-CM

## 2025-04-17 DIAGNOSIS — M77.51 BURSITIS OF HEEL, RIGHT: ICD-10-CM

## 2025-04-17 DIAGNOSIS — I10 ESSENTIAL (PRIMARY) HYPERTENSION: Primary | ICD-10-CM

## 2025-04-17 DIAGNOSIS — E78.5 HYPERLIPIDEMIA, UNSPECIFIED HYPERLIPIDEMIA TYPE: ICD-10-CM

## 2025-04-17 DIAGNOSIS — M81.0 OSTEOPOROSIS, UNSPECIFIED OSTEOPOROSIS TYPE, UNSPECIFIED PATHOLOGICAL FRACTURE PRESENCE: ICD-10-CM

## 2025-04-17 PROCEDURE — 1159F MED LIST DOCD IN RCRD: CPT | Performed by: FAMILY MEDICINE

## 2025-04-17 PROCEDURE — 1123F ACP DISCUSS/DSCN MKR DOCD: CPT | Performed by: FAMILY MEDICINE

## 2025-04-17 PROCEDURE — 1090F PRES/ABSN URINE INCON ASSESS: CPT | Performed by: FAMILY MEDICINE

## 2025-04-17 PROCEDURE — G8427 DOCREV CUR MEDS BY ELIG CLIN: HCPCS | Performed by: FAMILY MEDICINE

## 2025-04-17 PROCEDURE — G2211 COMPLEX E/M VISIT ADD ON: HCPCS | Performed by: FAMILY MEDICINE

## 2025-04-17 PROCEDURE — 3078F DIAST BP <80 MM HG: CPT | Performed by: FAMILY MEDICINE

## 2025-04-17 PROCEDURE — 1125F AMNT PAIN NOTED PAIN PRSNT: CPT | Performed by: FAMILY MEDICINE

## 2025-04-17 PROCEDURE — 99214 OFFICE O/P EST MOD 30 MIN: CPT | Performed by: FAMILY MEDICINE

## 2025-04-17 PROCEDURE — G8417 CALC BMI ABV UP PARAM F/U: HCPCS | Performed by: FAMILY MEDICINE

## 2025-04-17 PROCEDURE — 1036F TOBACCO NON-USER: CPT | Performed by: FAMILY MEDICINE

## 2025-04-17 PROCEDURE — 3074F SYST BP LT 130 MM HG: CPT | Performed by: FAMILY MEDICINE

## 2025-04-17 PROCEDURE — G8399 PT W/DXA RESULTS DOCUMENT: HCPCS | Performed by: FAMILY MEDICINE

## 2025-04-17 ASSESSMENT — PATIENT HEALTH QUESTIONNAIRE - PHQ9
SUM OF ALL RESPONSES TO PHQ QUESTIONS 1-9: 0
1. LITTLE INTEREST OR PLEASURE IN DOING THINGS: NOT AT ALL
SUM OF ALL RESPONSES TO PHQ QUESTIONS 1-9: 0
2. FEELING DOWN, DEPRESSED OR HOPELESS: NOT AT ALL

## 2025-04-17 NOTE — PROGRESS NOTES
Chief Complaint   Patient presents with    Follow-up    Foot Pain     Right heel pain; 2/10 about a week.      \"Have you been to the ER, urgent care clinic since your last visit?  Hospitalized since your last visit?\"    NO    “Have you seen or consulted any other health care providers outside of [facility] since your last visit?”    NO       Financial Resource Strain: Low Risk  (10/17/2024)    Overall Financial Resource Strain (CARDIA)     Difficulty of Paying Living Expenses: Not very hard      Food Insecurity: No Food Insecurity (4/16/2025)    Hunger Vital Sign     Worried About Running Out of Food in the Last Year: Never true     Ran Out of Food in the Last Year: Never true            4/17/2025     2:10 PM   PHQ-9    Little interest or pleasure in doing things 0   Feeling down, depressed, or hopeless 0   PHQ-2 Score 0   PHQ-9 Total Score 0       Health Maintenance Due   Topic Date Due    Respiratory Syncytial Virus (RSV) Pregnant or age 60 yrs+ (1 - 1-dose 75+ series) Never done    COVID-19 Vaccine (7 - 2024-25 season) 09/01/2024

## 2025-04-17 NOTE — ASSESSMENT & PLAN NOTE
Chronic, at goal (stable), continue current plan pending work up below    Orders:    Hemoglobin A1C; Future

## 2025-04-17 NOTE — PROGRESS NOTES
Patient Name: Malorie Shane   MRN: [id number]    ASSESSMENT AND PLAN  Malorie Shane is a 80 y.o. female who presents today for:    Assessment & Plan  Essential (primary) hypertension   Chronic, at goal (stable), continue current treatment plan         Osteoporosis, unspecified osteoporosis type, unspecified pathological fracture presence   Chronic, not at goal (unstable), pt to see endocrinology.         Prediabetes   Chronic, at goal (stable), continue current plan pending work up below    Orders:    Hemoglobin A1C; Future    Hyperlipidemia, unspecified hyperlipidemia type   Chronic, at goal (stable), continue current plan pending work up below    Orders:    CBC; Future    Comprehensive Metabolic Panel; Future    Lipid Panel; Future    Bursitis of heel, right   Recommend heel cup, icing, and exercises. Continue to monitor.           No orders of the defined types were placed in this encounter.       There are no discontinued medications.    Return in about 6 months (around 10/17/2025) for HTN.      SUBJECTIVE  Malorie Shane is a 80 y.o. female who presents with the following:     Reports 1 week history of right heel pain.  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  Has an upcoming endocrinology appointment this December for osteoporosis management.  Has been on Evista for 10 years and wants to stick with oral medications.     BP Readings from Last 3 Encounters:   04/17/25 124/70   10/17/24 126/66   04/15/24 122/74     Wt Readings from Last 3 Encounters:   04/17/25 84.6 kg (186 lb 6.4 oz)   10/17/24 84.9 kg (187 lb 3.2 oz)   04/15/24 86.1 kg (189 lb 12.8 oz)       All other ROS were reviewed and are negative except as discussed in HPI.  The patient's medications, allergies, past medical history, surgical history, family history and social history were reviewed and updated where appropriate.    Current Outpatient Medications   Medication Sig Dispense Refill

## 2025-04-18 ENCOUNTER — RESULTS FOLLOW-UP (OUTPATIENT)
Age: 80
End: 2025-04-18

## 2025-04-18 LAB
ALBUMIN SERPL-MCNC: 3.5 G/DL (ref 3.5–5)
ALBUMIN/GLOB SERPL: 1.2 (ref 1.1–2.2)
ALP SERPL-CCNC: 56 U/L (ref 45–117)
ALT SERPL-CCNC: 32 U/L (ref 12–78)
ANION GAP SERPL CALC-SCNC: 8 MMOL/L (ref 2–12)
AST SERPL-CCNC: 37 U/L (ref 15–37)
BILIRUB SERPL-MCNC: 0.3 MG/DL (ref 0.2–1)
BUN SERPL-MCNC: 16 MG/DL (ref 6–20)
BUN/CREAT SERPL: 24 (ref 12–20)
CALCIUM SERPL-MCNC: 9.6 MG/DL (ref 8.5–10.1)
CHLORIDE SERPL-SCNC: 102 MMOL/L (ref 97–108)
CHOLEST SERPL-MCNC: 188 MG/DL
CO2 SERPL-SCNC: 31 MMOL/L (ref 21–32)
CREAT SERPL-MCNC: 0.67 MG/DL (ref 0.55–1.02)
ERYTHROCYTE [DISTWIDTH] IN BLOOD BY AUTOMATED COUNT: 15.1 % (ref 11.5–14.5)
EST. AVERAGE GLUCOSE BLD GHB EST-MCNC: 117 MG/DL
GLOBULIN SER CALC-MCNC: 3 G/DL (ref 2–4)
GLUCOSE SERPL-MCNC: 127 MG/DL (ref 65–100)
HBA1C MFR BLD: 5.7 % (ref 4–5.6)
HCT VFR BLD AUTO: 41.2 % (ref 35–47)
HDLC SERPL-MCNC: 66 MG/DL
HDLC SERPL: 2.8 (ref 0–5)
HGB BLD-MCNC: 13.8 G/DL (ref 11.5–16)
LDLC SERPL CALC-MCNC: 75.2 MG/DL (ref 0–100)
MCH RBC QN AUTO: 30.6 PG (ref 26–34)
MCHC RBC AUTO-ENTMCNC: 33.5 G/DL (ref 30–36.5)
MCV RBC AUTO: 91.4 FL (ref 80–99)
NRBC # BLD: 0 K/UL (ref 0–0.01)
NRBC BLD-RTO: 0 PER 100 WBC
PLATELET # BLD AUTO: 243 K/UL (ref 150–400)
PMV BLD AUTO: 10.5 FL (ref 8.9–12.9)
POTASSIUM SERPL-SCNC: 3.5 MMOL/L (ref 3.5–5.1)
PROT SERPL-MCNC: 6.5 G/DL (ref 6.4–8.2)
RBC # BLD AUTO: 4.51 M/UL (ref 3.8–5.2)
SODIUM SERPL-SCNC: 141 MMOL/L (ref 136–145)
TRIGL SERPL-MCNC: 234 MG/DL
VLDLC SERPL CALC-MCNC: 46.8 MG/DL
WBC # BLD AUTO: 7.3 K/UL (ref 3.6–11)

## 2025-07-14 RX ORDER — INDAPAMIDE 2.5 MG/1
2.5 TABLET ORAL DAILY
Qty: 90 TABLET | Refills: 3 | Status: SHIPPED | OUTPATIENT
Start: 2025-07-14

## 2025-07-14 NOTE — TELEPHONE ENCOUNTER
PCP: Tomas Choi MD    Last appt: 4/17/2025       Future Appointments   Date Time Provider Department Center   10/16/2025  2:15 PM Tomas Choi MD [facility]   12/2/2025 10:30 AM Bebe Garber MD [facility]       Requested Prescriptions     Pending Prescriptions Disp Refills    indapamide (LOZOL) 2.5 MG tablet [Pharmacy Med Name: INDAPAMIDE TABS 2.5MG] 90 tablet 3     Sig: TAKE 1 TABLET DAILY       Prior labs and Blood pressures:  BP Readings from Last 3 Encounters:   04/17/25 124/70   10/17/24 126/66   04/15/24 122/74     Lab Results   Component Value Date/Time     04/17/2025 02:57 PM    K 3.5 04/17/2025 02:57 PM     04/17/2025 02:57 PM    CO2 31 04/17/2025 02:57 PM    BUN 16 04/17/2025 02:57 PM    GFRAA >60 09/27/2022 10:24 AM     No results found for: \"HBA1C\", \"[id number]\"  Lab Results   Component Value Date/Time    CHOL 188 04/17/2025 02:57 PM    HDL 66 04/17/2025 02:57 PM    LDL 75.2 04/17/2025 02:57 PM    LDL 83.2 09/14/2023 09:25 AM    VLDL 46.8 04/17/2025 02:57 PM     No results found for: \"VITD3\"    Lab Results   Component Value Date/Time    TSH 3.910 07/23/2020 08:18 AM